# Patient Record
Sex: FEMALE | Race: BLACK OR AFRICAN AMERICAN | NOT HISPANIC OR LATINO | Employment: OTHER | RURAL
[De-identification: names, ages, dates, MRNs, and addresses within clinical notes are randomized per-mention and may not be internally consistent; named-entity substitution may affect disease eponyms.]

---

## 2017-06-27 ENCOUNTER — OFFICE VISIT (OUTPATIENT)
Dept: FAMILY MEDICINE CLINIC | Facility: CLINIC | Age: 57
End: 2017-06-27

## 2017-06-27 VITALS
SYSTOLIC BLOOD PRESSURE: 116 MMHG | TEMPERATURE: 97.7 F | RESPIRATION RATE: 16 BRPM | HEIGHT: 62 IN | DIASTOLIC BLOOD PRESSURE: 80 MMHG | WEIGHT: 150 LBS | BODY MASS INDEX: 27.6 KG/M2 | OXYGEN SATURATION: 98 % | HEART RATE: 83 BPM

## 2017-06-27 DIAGNOSIS — I10 ESSENTIAL HYPERTENSION: Primary | ICD-10-CM

## 2017-06-27 DIAGNOSIS — Z11.59 NEED FOR HEPATITIS C SCREENING TEST: ICD-10-CM

## 2017-06-27 DIAGNOSIS — K59.00 CONSTIPATION, UNSPECIFIED CONSTIPATION TYPE: ICD-10-CM

## 2017-06-27 DIAGNOSIS — Z12.31 ENCOUNTER FOR SCREENING MAMMOGRAM FOR MALIGNANT NEOPLASM OF BREAST: ICD-10-CM

## 2017-06-27 LAB
ALBUMIN SERPL-MCNC: 4.4 G/DL (ref 3.4–4.8)
ALBUMIN/GLOB SERPL: 1.4 G/DL (ref 1.1–1.8)
ALP SERPL-CCNC: 88 U/L (ref 38–126)
ALT SERPL W P-5'-P-CCNC: 26 U/L (ref 9–52)
ANION GAP SERPL CALCULATED.3IONS-SCNC: 11 MMOL/L (ref 5–15)
ARTICHOKE IGE QN: 143 MG/DL (ref 1–129)
AST SERPL-CCNC: 15 U/L (ref 14–36)
BILIRUB SERPL-MCNC: 0.3 MG/DL (ref 0.2–1.3)
BUN BLD-MCNC: 10 MG/DL (ref 7–21)
BUN/CREAT SERPL: 12 (ref 7–25)
CALCIUM SPEC-SCNC: 9.9 MG/DL (ref 8.4–10.2)
CHLORIDE SERPL-SCNC: 102 MMOL/L (ref 95–110)
CHOLEST SERPL-MCNC: 264 MG/DL (ref 0–199)
CO2 SERPL-SCNC: 27 MMOL/L (ref 22–31)
CREAT BLD-MCNC: 0.83 MG/DL (ref 0.5–1)
GFR SERPL CREATININE-BSD FRML MDRD: 85 ML/MIN/1.73 (ref 51–120)
GLOBULIN UR ELPH-MCNC: 3.2 GM/DL (ref 2.3–3.5)
GLUCOSE BLD-MCNC: 91 MG/DL (ref 60–100)
HDLC SERPL-MCNC: 100 MG/DL (ref 60–200)
LDLC/HDLC SERPL: 1.54 {RATIO} (ref 0–3.22)
POTASSIUM BLD-SCNC: 4.3 MMOL/L (ref 3.5–5.1)
PROT SERPL-MCNC: 7.6 G/DL (ref 6.3–8.6)
SODIUM BLD-SCNC: 140 MMOL/L (ref 137–145)
TRIGL SERPL-MCNC: 51 MG/DL (ref 20–199)

## 2017-06-27 PROCEDURE — 99214 OFFICE O/P EST MOD 30 MIN: CPT | Performed by: NURSE PRACTITIONER

## 2017-06-27 PROCEDURE — 80061 LIPID PANEL: CPT | Performed by: NURSE PRACTITIONER

## 2017-06-27 PROCEDURE — 80053 COMPREHEN METABOLIC PANEL: CPT | Performed by: NURSE PRACTITIONER

## 2017-06-27 PROCEDURE — 86803 HEPATITIS C AB TEST: CPT | Performed by: NURSE PRACTITIONER

## 2017-06-27 RX ORDER — POLYETHYLENE GLYCOL 3350 17 G/17G
17 POWDER, FOR SOLUTION ORAL DAILY
Qty: 578 G | Refills: 3 | Status: SHIPPED | OUTPATIENT
Start: 2017-06-27 | End: 2021-07-08

## 2017-06-27 RX ORDER — POLYETHYLENE GLYCOL 3350 17 G/17G
17 POWDER, FOR SOLUTION ORAL DAILY
COMMUNITY
End: 2017-06-27 | Stop reason: CLARIF

## 2017-06-27 NOTE — PROGRESS NOTES
Subjective   Maggie Mathews is a 57 y.o. female.     HPI Comments: Here today to establish.  Has a hx of htn and a psychiatric hx.  She currently sees mental health who prescribe some of her meds.  She also take metroprolol for her b/p and has been out of it.  She is in need of a pap and a mammogram.  She denies any significant family history.    She also says she has had some constipation and was placed on miralax by the [facility] care staff and she will need a refill.    Hypertension   This is a chronic problem. The current episode started more than 1 year ago. The problem is uncontrolled. Pertinent negatives include no anxiety, blurred vision, chest pain, headaches, malaise/fatigue, neck pain, orthopnea, palpitations, peripheral edema, PND, shortness of breath or sweats. There are no associated agents to hypertension. Risk factors for coronary artery disease include post-menopausal state. Past treatments include beta blockers. The current treatment provides significant improvement. Compliance problems include psychosocial issues.  There is no history of angina, kidney disease, CAD/MI, CVA, heart failure, left ventricular hypertrophy, PVD or retinopathy.        The following portions of the patient's history were reviewed and updated as appropriate: allergies, current medications, past family history, past medical history, past social history, past surgical history and problem list.    Review of Systems   Constitutional: Negative.  Negative for malaise/fatigue.   HENT: Negative.    Eyes: Negative for blurred vision.   Respiratory: Negative.  Negative for shortness of breath.    Cardiovascular: Negative.  Negative for chest pain, palpitations, orthopnea and PND.   Musculoskeletal: Negative.  Negative for neck pain.   Skin: Negative.    Neurological: Negative.  Negative for headaches.   Psychiatric/Behavioral: Negative.        Objective   Physical Exam   Constitutional: She is oriented to person, place, and  time. She appears well-developed and well-nourished. No distress.   HENT:   Head: Normocephalic.   Eyes: Pupils are equal, round, and reactive to light.   Neck: Normal range of motion. Neck supple. No thyromegaly present.   Cardiovascular: Normal rate, regular rhythm and normal heart sounds.  Exam reveals no friction rub.    No murmur heard.  Pulmonary/Chest: Effort normal and breath sounds normal. No respiratory distress. She has no wheezes. She has no rales.   Abdominal: Soft.   Musculoskeletal: Normal range of motion.   Neurological: She is alert and oriented to person, place, and time.   Skin: Skin is warm and dry.   Psychiatric: She has a normal mood and affect. Thought content normal.   Nursing note and vitals reviewed.      Assessment/Plan   Maggie was seen today for hypertension.    Diagnoses and all orders for this visit:    Essential hypertension  -     metoprolol tartrate (LOPRESSOR) 25 MG tablet; Take 1 tablet by mouth 2 (Two) Times a Day.  -     Comprehensive metabolic panel  -     Lipid panel    Constipation, unspecified constipation type  -     polyethylene glycol (MIRALAX) powder; Take 17 g by mouth Daily. X 2 weeks, then 2 or 3 days a week prn for constipation.    Need for hepatitis C screening test  -     Hepatitis C antibody    Encounter for screening mammogram for malignant neoplasm of breast   -     Mammo Screening Bilateral With CAD; Future

## 2017-06-28 ENCOUNTER — TELEPHONE (OUTPATIENT)
Dept: FAMILY MEDICINE CLINIC | Facility: CLINIC | Age: 57
End: 2017-06-28

## 2017-06-28 LAB — HCV AB SER DONR QL: NEGATIVE

## 2017-06-28 NOTE — TELEPHONE ENCOUNTER
Mammogram appt Scheduled at [facility] on 08/11/2017 at 4 pm .     left message to contact office and letter sent out with appt date time and location to address on file,

## 2017-06-30 ENCOUNTER — TELEPHONE (OUTPATIENT)
Dept: FAMILY MEDICINE CLINIC | Facility: CLINIC | Age: 57
End: 2017-06-30

## 2017-06-30 RX ORDER — PRAVASTATIN SODIUM 20 MG
20 TABLET ORAL DAILY
Qty: 30 TABLET | Refills: 3 | Status: SHIPPED | OUTPATIENT
Start: 2017-06-30 | End: 2017-10-27 | Stop reason: SDUPTHER

## 2017-06-30 NOTE — TELEPHONE ENCOUNTER
----- Message from MARLY Davison sent at 6/30/2017  7:58 AM CDT -----  Her chol is pretty high.  We needs to start her on pravastatin 20mg a day and genesis in 3 months.

## 2017-09-18 ENCOUNTER — PROCEDURE VISIT (OUTPATIENT)
Dept: FAMILY MEDICINE CLINIC | Facility: CLINIC | Age: 57
End: 2017-09-18

## 2017-09-18 VITALS
DIASTOLIC BLOOD PRESSURE: 68 MMHG | OXYGEN SATURATION: 98 % | WEIGHT: 142 LBS | BODY MASS INDEX: 26.13 KG/M2 | TEMPERATURE: 98.5 F | HEIGHT: 62 IN | HEART RATE: 68 BPM | RESPIRATION RATE: 16 BRPM | SYSTOLIC BLOOD PRESSURE: 110 MMHG

## 2017-09-18 DIAGNOSIS — Z12.4 SCREENING FOR CERVICAL CANCER: Primary | ICD-10-CM

## 2017-09-18 PROCEDURE — 88142 CYTOPATH C/V THIN LAYER: CPT | Performed by: NURSE PRACTITIONER

## 2017-09-18 PROCEDURE — G0101 CA SCREEN;PELVIC/BREAST EXAM: HCPCS | Performed by: NURSE PRACTITIONER

## 2017-09-18 RX ORDER — RISPERIDONE 1 MG/1
1 TABLET ORAL DAILY
COMMUNITY
Start: 2017-09-11 | End: 2022-06-12

## 2017-09-18 NOTE — PROGRESS NOTES
"Subjective   History of Present Illness    Maggie Mathews is a 57 y.o. female who presents for annual exam.    Obstetric History:    1, para 1, full term 1  Menstrual History:     No LMP recorded. thinks periods stopped about age 48.       Sexual History:   not sexually active      Current contraception: tubal ligation  History of abnormal Pap smear: no  ANAY exposure in utero: no  Received Gardasil immunization: no  Perform regular self breast exam: yes - does monthly  Family history of uterine or ovarian cancer: no  Family History of cervical cancer: no  Family History of colon cancer/colon polyps: yes - thinks sister may have had colon cancer.  Regular self breast exam: yes  History of abnormal mammogram: no  Family history of breast cancer: no  History of abnormal lipids: no    The following portions of the patient's history were reviewed and updated as appropriate: allergies, current medications, past family history, past medical history, past social history, past surgical history and problem list.    Review of Systems   Constitutional: Negative.    HENT: Negative.    Respiratory: Negative.    Cardiovascular: Negative.    Gastrointestinal: Negative.    Musculoskeletal: Negative.    Skin: Negative.    Neurological: Negative.    Psychiatric/Behavioral: Negative.        Pertinent items are noted in HPI.     Objective   Physical Exam   Constitutional: She appears well-developed and well-nourished. No distress.   Genitourinary: Rectal exam shows guaiac negative stool. No vaginal discharge found.   Nursing note and vitals reviewed.      /68 (BP Location: Right arm, Patient Position: Sitting, Cuff Size: Adult)  Pulse 68  Temp 98.5 °F (36.9 °C) (Tympanic)   Resp 16  Ht 62\" (157.5 cm)  Wt 142 lb (64.4 kg)  SpO2 98%  BMI 25.97 kg/m2    General:   alert, appears stated age and cooperative   Heart: regular rate and rhythm, S1, S2 normal, no murmur, click, rub or gallop   Lungs: clear to auscultation " bilaterally   Abdomen: soft, non-tender, without masses or organomegaly   Breast: inspection negative, no nipple discharge or bleeding, no masses or nodularity palpable   Vulva: normal   Vagina: normal mucosa   Cervix: no lesions   Uterus: normal size   Adnexa: normal adnexa     Assessment/Plan   There are no diagnoses linked to this encounter.    Await pap smear results.

## 2017-09-25 LAB
LAB AP CASE REPORT: NORMAL
LAB AP GYN ADDITIONAL INFORMATION: NORMAL
LAB AP GYN OTHER FINDINGS: NORMAL
Lab: NORMAL
PATH INTERP SPEC-IMP: NORMAL
STAT OF ADQ CVX/VAG CYTO-IMP: NORMAL

## 2017-10-27 DIAGNOSIS — I10 ESSENTIAL HYPERTENSION: ICD-10-CM

## 2017-10-27 RX ORDER — PRAVASTATIN SODIUM 20 MG
20 TABLET ORAL DAILY
Qty: 30 TABLET | Refills: 3 | Status: SHIPPED | OUTPATIENT
Start: 2017-10-27 | End: 2018-02-26 | Stop reason: SDUPTHER

## 2018-02-26 DIAGNOSIS — I10 ESSENTIAL HYPERTENSION: ICD-10-CM

## 2018-02-26 RX ORDER — PRAVASTATIN SODIUM 20 MG
20 TABLET ORAL DAILY
Qty: 30 TABLET | Refills: 3 | Status: SHIPPED | OUTPATIENT
Start: 2018-02-26 | End: 2018-05-11 | Stop reason: SDUPTHER

## 2018-03-08 ENCOUNTER — OFFICE VISIT (OUTPATIENT)
Dept: FAMILY MEDICINE CLINIC | Facility: CLINIC | Age: 58
End: 2018-03-08

## 2018-03-08 VITALS
HEART RATE: 70 BPM | OXYGEN SATURATION: 98 % | WEIGHT: 153 LBS | TEMPERATURE: 98.1 F | HEIGHT: 62 IN | RESPIRATION RATE: 20 BRPM | BODY MASS INDEX: 28.16 KG/M2 | DIASTOLIC BLOOD PRESSURE: 58 MMHG | SYSTOLIC BLOOD PRESSURE: 90 MMHG

## 2018-03-08 DIAGNOSIS — J06.9 UPPER RESPIRATORY TRACT INFECTION, UNSPECIFIED TYPE: ICD-10-CM

## 2018-03-08 DIAGNOSIS — R05.9 COUGH: Primary | ICD-10-CM

## 2018-03-08 PROCEDURE — 99213 OFFICE O/P EST LOW 20 MIN: CPT | Performed by: NURSE PRACTITIONER

## 2018-03-08 RX ORDER — DEXTROMETHORPHAN HYDROBROMIDE AND PROMETHAZINE HYDROCHLORIDE 15; 6.25 MG/5ML; MG/5ML
5 SYRUP ORAL 4 TIMES DAILY PRN
Qty: 118 ML | Refills: 0 | Status: SHIPPED | OUTPATIENT
Start: 2018-03-08 | End: 2019-07-08

## 2018-03-08 RX ORDER — LORATADINE 10 MG/1
10 TABLET ORAL DAILY
Qty: 20 TABLET | Refills: 1 | Status: SHIPPED | OUTPATIENT
Start: 2018-03-08 | End: 2021-07-08

## 2018-03-08 NOTE — PROGRESS NOTES
Subjective   Maggie Mathews is a 57 y.o. female.     HPI Comments: Uri sx since last Saturday night, march 3, 2018.  She had sore throat and slight fever.  Took hamlet seltzer + for sx.    Cough   This is a new problem. The current episode started in the past 7 days. The problem has been gradually improving. The problem occurs every few hours. The cough is non-productive. Associated symptoms include a fever (low grade), nasal congestion, rhinorrhea and a sore throat. Pertinent negatives include no chest pain, chills, ear congestion, ear pain, headaches, heartburn, hemoptysis, myalgias, postnasal drip, rash, shortness of breath, sweats, weight loss or wheezing. Nothing aggravates the symptoms. Risk factors for lung disease include animal exposure. Treatments tried: hamlet seltzer + The treatment provided moderate relief. There is no history of asthma, bronchiectasis, bronchitis, COPD, emphysema, environmental allergies or pneumonia.   Sore Throat    This is a new problem. The current episode started in the past 7 days. The problem has been gradually improving. Neither side of throat is experiencing more pain than the other. Maximum temperature: reports low grade. The pain is at a severity of 0/10. The patient is experiencing no pain. Associated symptoms include congestion and coughing. Pertinent negatives include no abdominal pain, diarrhea, drooling, ear discharge, ear pain, headaches, hoarse voice, plugged ear sensation, neck pain, shortness of breath, stridor, swollen glands, trouble swallowing or vomiting. She has had no exposure to strep or mono. Treatments tried: hamlet seltzer + The treatment provided moderate relief.        The following portions of the patient's history were reviewed and updated as appropriate: allergies, current medications, past family history, past medical history, past social history, past surgical history and problem list.    Review of Systems   Constitutional: Positive for fever (low  grade). Negative for chills and weight loss.   HENT: Positive for congestion, rhinorrhea and sore throat. Negative for drooling, ear discharge, ear pain, hoarse voice, postnasal drip and trouble swallowing.    Respiratory: Positive for cough. Negative for hemoptysis, shortness of breath, wheezing and stridor.    Cardiovascular: Negative.  Negative for chest pain.   Gastrointestinal: Negative.  Negative for abdominal pain, diarrhea, heartburn and vomiting.   Musculoskeletal: Negative.  Negative for myalgias and neck pain.   Skin: Negative.  Negative for rash.   Allergic/Immunologic: Negative for environmental allergies.   Neurological: Negative.  Negative for headaches.   Psychiatric/Behavioral: Negative.        Objective   Physical Exam   Constitutional: She is oriented to person, place, and time. She appears well-developed and well-nourished. No distress.   HENT:   Head: Normocephalic.   Right Ear: Hearing, tympanic membrane, external ear and ear canal normal. Tympanic membrane is not injected.   Left Ear: Hearing, tympanic membrane, external ear and ear canal normal. Tympanic membrane is not injected.   Nose: Rhinorrhea present. Right sinus exhibits no maxillary sinus tenderness and no frontal sinus tenderness. Left sinus exhibits no maxillary sinus tenderness and no frontal sinus tenderness.   Mouth/Throat: Oropharynx is clear and moist. No oropharyngeal exudate.   Eyes: Pupils are equal, round, and reactive to light.   Neck: Normal range of motion. Neck supple. No thyromegaly present.   Cardiovascular: Normal rate, regular rhythm and normal heart sounds.  Exam reveals no friction rub.    No murmur heard.  Pulmonary/Chest: Effort normal and breath sounds normal. No respiratory distress. She has no wheezes. She has no rales.   Abdominal: Soft.   Musculoskeletal: Normal range of motion.   Neurological: She is alert and oriented to person, place, and time.   Skin: Skin is warm and dry.   Psychiatric: She has a  normal mood and affect. Thought content normal.   Nursing note and vitals reviewed.      Assessment/Plan   Maggie was seen today for cough, sore throat and nasal congestion.    Diagnoses and all orders for this visit:    Cough  -     promethazine-dextromethorphan (PROMETHAZINE-DM) 6.25-15 MG/5ML syrup; Take 5 mL by mouth 4 (Four) Times a Day As Needed for Cough.    Upper respiratory tract infection, unspecified type  -     loratadine (CLARITIN) 10 MG tablet; Take 1 tablet by mouth Daily.

## 2018-05-11 DIAGNOSIS — J06.9 UPPER RESPIRATORY TRACT INFECTION, UNSPECIFIED TYPE: ICD-10-CM

## 2018-05-11 DIAGNOSIS — I10 ESSENTIAL HYPERTENSION: ICD-10-CM

## 2018-05-11 RX ORDER — PRAVASTATIN SODIUM 20 MG
20 TABLET ORAL DAILY
Qty: 30 TABLET | Refills: 3 | Status: SHIPPED | OUTPATIENT
Start: 2018-05-11 | End: 2018-10-18 | Stop reason: SDUPTHER

## 2018-08-06 DIAGNOSIS — Z12.39 SCREENING FOR BREAST CANCER: Primary | ICD-10-CM

## 2018-08-08 ENCOUNTER — TELEPHONE (OUTPATIENT)
Dept: FAMILY MEDICINE CLINIC | Facility: CLINIC | Age: 58
End: 2018-08-08

## 2018-10-08 DIAGNOSIS — Z12.39 SCREENING FOR BREAST CANCER: ICD-10-CM

## 2018-10-18 ENCOUNTER — OFFICE VISIT (OUTPATIENT)
Dept: FAMILY MEDICINE CLINIC | Facility: CLINIC | Age: 58
End: 2018-10-18

## 2018-10-18 ENCOUNTER — APPOINTMENT (OUTPATIENT)
Dept: LAB | Facility: HOSPITAL | Age: 58
End: 2018-10-18

## 2018-10-18 VITALS
RESPIRATION RATE: 18 BRPM | HEART RATE: 67 BPM | WEIGHT: 158 LBS | DIASTOLIC BLOOD PRESSURE: 55 MMHG | SYSTOLIC BLOOD PRESSURE: 97 MMHG | OXYGEN SATURATION: 98 % | TEMPERATURE: 98.2 F | HEIGHT: 62 IN | BODY MASS INDEX: 29.08 KG/M2

## 2018-10-18 DIAGNOSIS — Z23 IMMUNIZATION DUE: ICD-10-CM

## 2018-10-18 DIAGNOSIS — I10 ESSENTIAL HYPERTENSION: Primary | ICD-10-CM

## 2018-10-18 DIAGNOSIS — E78.5 HYPERLIPIDEMIA, UNSPECIFIED HYPERLIPIDEMIA TYPE: ICD-10-CM

## 2018-10-18 PROCEDURE — 80053 COMPREHEN METABOLIC PANEL: CPT | Performed by: NURSE PRACTITIONER

## 2018-10-18 PROCEDURE — 90674 CCIIV4 VAC NO PRSV 0.5 ML IM: CPT | Performed by: NURSE PRACTITIONER

## 2018-10-18 PROCEDURE — 80061 LIPID PANEL: CPT | Performed by: NURSE PRACTITIONER

## 2018-10-18 PROCEDURE — G0008 ADMIN INFLUENZA VIRUS VAC: HCPCS | Performed by: NURSE PRACTITIONER

## 2018-10-18 PROCEDURE — 99214 OFFICE O/P EST MOD 30 MIN: CPT | Performed by: NURSE PRACTITIONER

## 2018-10-18 RX ORDER — PRAVASTATIN SODIUM 20 MG
20 TABLET ORAL DAILY
Qty: 30 TABLET | Refills: 3 | Status: SHIPPED | OUTPATIENT
Start: 2018-10-18 | End: 2019-02-18 | Stop reason: SDUPTHER

## 2018-10-18 NOTE — PROGRESS NOTES
Subjective   Maggie Mathews is a 58 y.o. female.     Here today needing refills on her meds.  She has a hx of htn and hyperlip.  She is well controlled.  Her depression med is prescribed and monitored elsewhere.      Hypertension   This is a chronic problem. The current episode started more than 1 year ago. The problem is controlled. Pertinent negatives include no anxiety, blurred vision, chest pain, headaches, malaise/fatigue, neck pain, orthopnea, peripheral edema, PND, shortness of breath or sweats. There are no associated agents to hypertension. Risk factors for coronary artery disease include dyslipidemia, post-menopausal state and sedentary lifestyle. Past treatments include beta blockers. Current antihypertension treatment includes beta blockers. The current treatment provides significant improvement. There are no compliance problems.  There is no history of angina, kidney disease, CAD/MI, CVA, heart failure, PVD or retinopathy. There is no history of chronic renal disease.   Hyperlipidemia   This is a chronic problem. The current episode started more than 1 year ago. Recent lipid tests were reviewed and are normal. She has no history of chronic renal disease, diabetes, hypothyroidism, obesity or nephrotic syndrome. Factors aggravating her hyperlipidemia include beta blockers. Pertinent negatives include no chest pain, focal sensory loss, focal weakness, leg pain, myalgias or shortness of breath. Current antihyperlipidemic treatment includes statins. The current treatment provides significant improvement of lipids. There are no compliance problems.  Risk factors for coronary artery disease include dyslipidemia, hypertension and post-menopausal.        The following portions of the patient's history were reviewed and updated as appropriate: allergies, current medications, past family history, past medical history, past social history, past surgical history and problem list.    Review of Systems    Constitutional: Negative.  Negative for malaise/fatigue.   HENT: Negative.    Eyes: Negative for blurred vision.   Respiratory: Negative.  Negative for shortness of breath.    Cardiovascular: Negative.  Negative for chest pain, orthopnea and PND.   Musculoskeletal: Negative.  Negative for myalgias and neck pain.   Skin: Negative.    Neurological: Negative.  Negative for focal weakness.   Psychiatric/Behavioral: Negative.        Objective   Physical Exam   Constitutional: She is oriented to person, place, and time. She appears well-developed and well-nourished. No distress.   HENT:   Head: Normocephalic.   Eyes: Pupils are equal, round, and reactive to light.   Neck: Normal range of motion. Neck supple. No thyromegaly present.   Cardiovascular: Normal rate, regular rhythm and normal heart sounds.  Exam reveals no friction rub.    No murmur heard.  Pulmonary/Chest: Effort normal and breath sounds normal. No respiratory distress. She has no wheezes. She has no rales.   Abdominal: Soft. Bowel sounds are normal. She exhibits no distension.   Musculoskeletal: Normal range of motion.   Neurological: She is alert and oriented to person, place, and time.   Skin: Skin is warm and dry.   Psychiatric: She has a normal mood and affect. Thought content normal.   Nursing note and vitals reviewed.        Assessment/Plan   Maggie was seen today for hypertension, depression and hyperlipidemia.    Diagnoses and all orders for this visit:    Essential hypertension  -     metoprolol tartrate (LOPRESSOR) 25 MG tablet; Take 1 tablet by mouth 2 (Two) Times a Day.  -     Comprehensive metabolic panel    Hyperlipidemia, unspecified hyperlipidemia type  -     pravastatin (PRAVACHOL) 20 MG tablet; Take 1 tablet by mouth Daily.  -     Lipid panel    Immunization due  -     Flucelvax Quad=>4Years ([id number])

## 2018-10-19 LAB
ALBUMIN SERPL-MCNC: 4.1 G/DL (ref 3.4–4.8)
ALBUMIN/GLOB SERPL: 1.3 G/DL (ref 1.1–1.8)
ALP SERPL-CCNC: 88 U/L (ref 38–126)
ALT SERPL W P-5'-P-CCNC: 24 U/L (ref 9–52)
ANION GAP SERPL CALCULATED.3IONS-SCNC: 10 MMOL/L (ref 5–15)
ARTICHOKE IGE QN: 104 MG/DL (ref 1–129)
AST SERPL-CCNC: 19 U/L (ref 14–36)
BILIRUB SERPL-MCNC: 0.3 MG/DL (ref 0.2–1.3)
BUN BLD-MCNC: 10 MG/DL (ref 7–21)
BUN/CREAT SERPL: 12 (ref 7–25)
CALCIUM SPEC-SCNC: 9.4 MG/DL (ref 8.4–10.2)
CHLORIDE SERPL-SCNC: 100 MMOL/L (ref 95–110)
CHOLEST SERPL-MCNC: 203 MG/DL (ref 0–199)
CO2 SERPL-SCNC: 28 MMOL/L (ref 22–31)
CREAT BLD-MCNC: 0.83 MG/DL (ref 0.5–1)
GFR SERPL CREATININE-BSD FRML MDRD: 85 ML/MIN/1.73 (ref 45–104)
GLOBULIN UR ELPH-MCNC: 3.1 GM/DL (ref 2.3–3.5)
GLUCOSE BLD-MCNC: 67 MG/DL (ref 60–100)
HDLC SERPL-MCNC: 81 MG/DL (ref 60–200)
LDLC/HDLC SERPL: 1.28 {RATIO} (ref 0–3.22)
POTASSIUM BLD-SCNC: 4.2 MMOL/L (ref 3.5–5.1)
PROT SERPL-MCNC: 7.2 G/DL (ref 6.3–8.6)
SODIUM BLD-SCNC: 138 MMOL/L (ref 137–145)
TRIGL SERPL-MCNC: 90 MG/DL (ref 20–199)

## 2019-02-18 DIAGNOSIS — E78.5 HYPERLIPIDEMIA, UNSPECIFIED HYPERLIPIDEMIA TYPE: ICD-10-CM

## 2019-02-18 DIAGNOSIS — I10 ESSENTIAL HYPERTENSION: ICD-10-CM

## 2019-02-18 RX ORDER — PRAVASTATIN SODIUM 20 MG
20 TABLET ORAL DAILY
Qty: 30 TABLET | Refills: 3 | Status: SHIPPED | OUTPATIENT
Start: 2019-02-18 | End: 2019-06-20 | Stop reason: SDUPTHER

## 2019-06-19 ENCOUNTER — TELEPHONE (OUTPATIENT)
Dept: FAMILY MEDICINE CLINIC | Facility: CLINIC | Age: 59
End: 2019-06-19

## 2019-06-19 DIAGNOSIS — I10 ESSENTIAL HYPERTENSION: ICD-10-CM

## 2019-06-19 DIAGNOSIS — E78.5 HYPERLIPIDEMIA, UNSPECIFIED HYPERLIPIDEMIA TYPE: ICD-10-CM

## 2019-06-19 NOTE — TELEPHONE ENCOUNTER
Patient needs refill on Pravastatin and Metoprol pharmacy is [facility]

## 2019-06-20 RX ORDER — PRAVASTATIN SODIUM 20 MG
20 TABLET ORAL DAILY
Qty: 30 TABLET | Refills: 3 | Status: SHIPPED | OUTPATIENT
Start: 2019-06-20 | End: 2019-09-27 | Stop reason: SDUPTHER

## 2019-07-02 ENCOUNTER — TELEPHONE (OUTPATIENT)
Dept: FAMILY MEDICINE CLINIC | Facility: CLINIC | Age: 59
End: 2019-07-02

## 2019-07-02 ENCOUNTER — APPOINTMENT (OUTPATIENT)
Dept: LAB | Facility: HOSPITAL | Age: 59
End: 2019-07-02

## 2019-07-02 ENCOUNTER — PROCEDURE VISIT (OUTPATIENT)
Dept: FAMILY MEDICINE CLINIC | Facility: CLINIC | Age: 59
End: 2019-07-02

## 2019-07-02 VITALS
TEMPERATURE: 97.8 F | WEIGHT: 160 LBS | HEART RATE: 59 BPM | BODY MASS INDEX: 29.44 KG/M2 | HEIGHT: 62 IN | DIASTOLIC BLOOD PRESSURE: 50 MMHG | RESPIRATION RATE: 18 BRPM | OXYGEN SATURATION: 98 % | SYSTOLIC BLOOD PRESSURE: 102 MMHG

## 2019-07-02 DIAGNOSIS — I10 ESSENTIAL HYPERTENSION: ICD-10-CM

## 2019-07-02 DIAGNOSIS — E78.5 HYPERLIPIDEMIA, UNSPECIFIED HYPERLIPIDEMIA TYPE: ICD-10-CM

## 2019-07-02 DIAGNOSIS — Z12.4 SCREENING FOR CERVICAL CANCER: Primary | ICD-10-CM

## 2019-07-02 DIAGNOSIS — M25.511 ACUTE PAIN OF RIGHT SHOULDER: ICD-10-CM

## 2019-07-02 LAB
ALBUMIN SERPL-MCNC: 4.4 G/DL (ref 3.5–5.2)
ALBUMIN/GLOB SERPL: 1.4 G/DL
ALP SERPL-CCNC: 88 U/L (ref 39–117)
ALT SERPL W P-5'-P-CCNC: 8 U/L (ref 1–33)
ANION GAP SERPL CALCULATED.3IONS-SCNC: 11.5 MMOL/L (ref 5–15)
AST SERPL-CCNC: 10 U/L (ref 1–32)
BILIRUB SERPL-MCNC: 0.3 MG/DL (ref 0.2–1.2)
BUN BLD-MCNC: 9 MG/DL (ref 8–23)
BUN/CREAT SERPL: 10.1 (ref 7–25)
CALCIUM SPEC-SCNC: 10.1 MG/DL (ref 8.6–10.5)
CHLORIDE SERPL-SCNC: 103 MMOL/L (ref 98–107)
CHOLEST SERPL-MCNC: 209 MG/DL (ref 0–200)
CO2 SERPL-SCNC: 26.5 MMOL/L (ref 22–29)
CREAT BLD-MCNC: 0.89 MG/DL (ref 0.57–1)
GFR SERPL CREATININE-BSD FRML MDRD: 78 ML/MIN/1.73
GLOBULIN UR ELPH-MCNC: 3.1 GM/DL
GLUCOSE BLD-MCNC: 91 MG/DL (ref 65–99)
HDLC SERPL-MCNC: 87 MG/DL (ref 40–60)
LDLC SERPL CALC-MCNC: 107 MG/DL (ref 0–100)
LDLC/HDLC SERPL: 1.23 {RATIO}
POTASSIUM BLD-SCNC: 4.1 MMOL/L (ref 3.5–5.2)
PROT SERPL-MCNC: 7.5 G/DL (ref 6–8.5)
SODIUM BLD-SCNC: 141 MMOL/L (ref 136–145)
TRIGL SERPL-MCNC: 73 MG/DL (ref 0–150)
VLDLC SERPL-MCNC: 14.6 MG/DL (ref 5–40)

## 2019-07-02 PROCEDURE — G0101 CA SCREEN;PELVIC/BREAST EXAM: HCPCS | Performed by: NURSE PRACTITIONER

## 2019-07-02 PROCEDURE — G0123 SCREEN CERV/VAG THIN LAYER: HCPCS | Performed by: NURSE PRACTITIONER

## 2019-07-02 PROCEDURE — 80061 LIPID PANEL: CPT | Performed by: NURSE PRACTITIONER

## 2019-07-02 PROCEDURE — 80053 COMPREHEN METABOLIC PANEL: CPT | Performed by: NURSE PRACTITIONER

## 2019-07-02 PROCEDURE — 99214 OFFICE O/P EST MOD 30 MIN: CPT | Performed by: NURSE PRACTITIONER

## 2019-07-02 RX ORDER — DICLOFENAC SODIUM 75 MG/1
75 TABLET, DELAYED RELEASE ORAL 2 TIMES DAILY
Qty: 60 TABLET | Refills: 3 | Status: SHIPPED | OUTPATIENT
Start: 2019-07-02 | End: 2021-07-08

## 2019-07-02 NOTE — TELEPHONE ENCOUNTER
Dennis from [facility] pharmacy called for clarification on diclofenac 75mg, shows increased risk of upper GI bleeding with citalopram. Please call pharmacy.

## 2019-07-02 NOTE — PROGRESS NOTES
Subjective   History of Present Illness    Maggie Mathews is a 59 y.o. female who presents for annual exam.  She is also having right shoulder pain for the past 2 months.  No reported injuries.  It seems to be worse at night and she has some difficulty sleeping due to the pain.  Obstetric History:   1, para 1, full term 1   Menstrual History:     No LMP recorded. Patient is postmenopausal.   periods stopped in her 50s.    Sexual History:     Is not sexually active    Current contraception: post menopausal status  History of abnormal Pap smear: no  ANAY exposure in utero: no  Received Gardasil immunization: yes - monthly  Perform regular self breast exam: no  Family history of uterine or ovarian cancer: no  Family History of cervical cancer: no  Family History of colon cancer/colon polyps: yes - sister  Regular self breast exam: yes  History of abnormal mammogram: no  Family history of breast cancer: no  History of abnormal lipids: no    The following portions of the patient's history were reviewed and updated as appropriate: allergies, current medications, past family history, past medical history, past social history, past surgical history and problem list.    Review of Systems   Constitutional: Negative.    HENT: Negative.    Eyes: Negative.    Respiratory: Negative.    Cardiovascular: Negative.    Gastrointestinal: Negative.    Genitourinary: Negative.    Musculoskeletal: Positive for arthralgias (right shoulder).   Skin: Negative.    Neurological: Negative.    Psychiatric/Behavioral: Negative.        Pertinent items are noted in HPI.     Objective   Physical Exam   Constitutional: She appears well-developed and well-nourished. No distress.   Genitourinary: Rectal exam shows guaiac negative stool.   Musculoskeletal:        Right shoulder: She exhibits tenderness and pain. She exhibits normal range of motion.   X ray of right shoulder reviewed and is negative.  She has good rom and is able to extend her  "right arm above her head without difficulty.   Nursing note and vitals reviewed.      /50 (BP Location: Right arm, Patient Position: Sitting, Cuff Size: Adult)   Pulse 59   Temp 97.8 °F (36.6 °C) (Tympanic)   Resp 18   Ht 157.5 cm (62\")   Wt 72.6 kg (160 lb)   SpO2 98%   BMI 29.26 kg/m²     General:   alert, appears stated age and cooperative   Heart: regular rate and rhythm, S1, S2 normal, no murmur, click, rub or gallop   Lungs: clear to auscultation bilaterally   Abdomen: soft, non-tender, without masses or organomegaly   Breast: inspection negative, no nipple discharge or bleeding, no masses or nodularity palpable, mammo was done in April 2019 at [facility] and was reported as normal.   Vulva: normal   Vagina: normal mucosa   Cervix: no lesions   Uterus: normal size   Adnexa: normal adnexa     Assessment/Plan   Maggie was seen today for gynecologic exam.    Diagnoses and all orders for this visit:    Screening for cervical cancer  -     Liquid-based Pap Smear, Screening    Hyperlipidemia, unspecified hyperlipidemia type  -     Lipid Panel    Essential hypertension  -     Comprehensive metabolic panel    Acute pain of right shoulder  -     XR Shoulder 2+ View Right (In Office)  -     diclofenac (VOLTAREN) 75 MG EC tablet; Take 1 tablet by mouth 2 (Two) Times a Day.        Await pap smear results.             "

## 2019-07-05 LAB
GEN CATEG CVX/VAG CYTO-IMP: NORMAL
LAB AP CASE REPORT: NORMAL
LAB AP GYN ADDITIONAL INFORMATION: NORMAL
PATH INTERP SPEC-IMP: NORMAL
STAT OF ADQ CVX/VAG CYTO-IMP: NORMAL

## 2019-07-08 NOTE — PATIENT INSTRUCTIONS
Calorie Counting for Weight Loss  Calories are units of energy. Your body needs a certain amount of calories from food to keep you going throughout the day. When you eat more calories than your body needs, your body stores the extra calories as fat. When you eat fewer calories than your body needs, your body burns fat to get the energy it needs.  Calorie counting means keeping track of how many calories you eat and drink each day. Calorie counting can be helpful if you need to lose weight. If you make sure to eat fewer calories than your body needs, you should lose weight. Ask your health care provider what a healthy weight is for you.  For calorie counting to work, you will need to eat the right number of calories in a day in order to lose a healthy amount of weight per week. A dietitian can help you determine how many calories you need in a day and will give you suggestions on how to reach your calorie goal.  · A healthy amount of weight to lose per week is usually 1-2 lb (0.5-0.9 kg). This usually means that your daily calorie intake should be reduced by 500-750 calories.  · Eating 1,200 - 1,500 calories per day can help most women lose weight.  · Eating 1,500 - 1,800 calories per day can help most men lose weight.    What is my plan?  My goal is to have __________ calories per day.  If I have this many calories per day, I should lose around __________ pounds per week.  What do I need to know about calorie counting?  In order to meet your daily calorie goal, you will need to:  · Find out how many calories are in each food you would like to eat. Try to do this before you eat.  · Decide how much of the food you plan to eat.  · Write down what you ate and how many calories it had. Doing this is called keeping a food log.    To successfully lose weight, it is important to balance calorie counting with a healthy lifestyle that includes regular activity. Aim for 150 minutes of moderate exercise (such as walking) or 75  minutes of vigorous exercise (such as running) each week.  Where do I find calorie information?    The number of calories in a food can be found on a Nutrition Facts label. If a food does not have a Nutrition Facts label, try to look up the calories online or ask your dietitian for help.  Remember that calories are listed per serving. If you choose to have more than one serving of a food, you will have to multiply the calories per serving by the amount of servings you plan to eat. For example, the label on a package of bread might say that a serving size is 1 slice and that there are 90 calories in a serving. If you eat 1 slice, you will have eaten 90 calories. If you eat 2 slices, you will have eaten 180 calories.  How do I keep a food log?  Immediately after each meal, record the following information in your food log:  · What you ate. Don't forget to include toppings, sauces, and other extras on the food.  · How much you ate. This can be measured in cups, ounces, or number of items.  · How many calories each food and drink had.  · The total number of calories in the meal.    Keep your food log near you, such as in a small notebook in your pocket, or use a mobile glendy or website. Some programs will calculate calories for you and show you how many calories you have left for the day to meet your goal.  What are some calorie counting tips?  · Use your calories on foods and drinks that will fill you up and not leave you hungry:  ? Some examples of foods that fill you up are nuts and nut butters, vegetables, lean proteins, and high-fiber foods like whole grains. High-fiber foods are foods with more than 5 g fiber per serving.  ? Drinks such as sodas, specialty coffee drinks, alcohol, and juices have a lot of calories, yet do not fill you up.  · Eat nutritious foods and avoid empty calories. Empty calories are calories you get from foods or beverages that do not have many vitamins or protein, such as candy, sweets, and  "soda. It is better to have a nutritious high-calorie food (such as an avocado) than a food with few nutrients (such as a bag of chips).  · Know how many calories are in the foods you eat most often. This will help you calculate calorie counts faster.  · Pay attention to calories in drinks. Low-calorie drinks include water and unsweetened drinks.  · Pay attention to nutrition labels for \"low fat\" or \"fat free\" foods. These foods sometimes have the same amount of calories or more calories than the full fat versions. They also often have added sugar, starch, or salt, to make up for flavor that was removed with the fat.  · Find a way of tracking calories that works for you. Get creative. Try different apps or programs if writing down calories does not work for you.  What are some portion control tips?  · Know how many calories are in a serving. This will help you know how many servings of a certain food you can have.  · Use a measuring cup to measure serving sizes. You could also try weighing out portions on a kitchen scale. With time, you will be able to estimate serving sizes for some foods.  · Take some time to put servings of different foods on your favorite plates, bowls, and cups so you know what a serving looks like.  · Try not to eat straight from a bag or box. Doing this can lead to overeating. Put the amount you would like to eat in a cup or on a plate to make sure you are eating the right portion.  · Use smaller plates, glasses, and bowls to prevent overeating.  · Try not to multitask (for example, watch TV or use your computer) while eating. If it is time to eat, sit down at a table and enjoy your food. This will help you to know when you are full. It will also help you to be aware of what you are eating and how much you are eating.  What are tips for following this plan?  Reading food labels  · Check the calorie count compared to the serving size. The serving size may be smaller than what you are used to " eating.  · Check the source of the calories. Make sure the food you are eating is high in vitamins and protein and low in saturated and trans fats.  Shopping  · Read nutrition labels while you shop. This will help you make healthy decisions before you decide to purchase your food.  · Make a grocery list and stick to it.  Cooking  · Try to cook your favorite foods in a healthier way. For example, try baking instead of frying.  · Use low-fat dairy products.  Meal planning  · Use more fruits and vegetables. Half of your plate should be fruits and vegetables.  · Include lean proteins like poultry and fish.  How do I count calories when eating out?  · Ask for smaller portion sizes.  · Consider sharing an entree and sides instead of getting your own entree.  · If you get your own entree, eat only half. Ask for a box at the beginning of your meal and put the rest of your entree in it so you are not tempted to eat it.  · If calories are listed on the menu, choose the lower calorie options.  · Choose dishes that include vegetables, fruits, whole grains, low-fat dairy products, and lean protein.  · Choose items that are boiled, broiled, grilled, or steamed. Stay away from items that are buttered, battered, fried, or served with cream sauce. Items labeled “crispy” are usually fried, unless stated otherwise.  · Choose water, low-fat milk, unsweetened iced tea, or other drinks without added sugar. If you want an alcoholic beverage, choose a lower calorie option such as a glass of wine or light beer.  · Ask for dressings, sauces, and syrups on the side. These are usually high in calories, so you should limit the amount you eat.  · If you want a salad, choose a garden salad and ask for grilled meats. Avoid extra toppings like romero, cheese, or fried items. Ask for the dressing on the side, or ask for olive oil and vinegar or lemon to use as dressing.  · Estimate how many servings of a food you are given. For example, a serving of  cooked rice is ½ cup or about the size of half a baseball. Knowing serving sizes will help you be aware of how much food you are eating at restaurants. The list below tells you how big or small some common portion sizes are based on everyday objects:  ? 1 oz--4 stacked dice.  ? 3 oz--1 deck of cards.  ? 1 tsp--1 die.  ? 1 Tbsp--½ a ping-pong ball.  ? 2 Tbsp--1 ping-pong ball.  ? ½ cup--½ baseball.  ? 1 cup--1 baseball.  Summary  · Calorie counting means keeping track of how many calories you eat and drink each day. If you eat fewer calories than your body needs, you should lose weight.  · A healthy amount of weight to lose per week is usually 1-2 lb (0.5-0.9 kg). This usually means reducing your daily calorie intake by 500-750 calories.  · The number of calories in a food can be found on a Nutrition Facts label. If a food does not have a Nutrition Facts label, try to look up the calories online or ask your dietitian for help.  · Use your calories on foods and drinks that will fill you up, and not on foods and drinks that will leave you hungry.  · Use smaller plates, glasses, and bowls to prevent overeating.  This information is not intended to replace advice given to you by your health care provider. Make sure you discuss any questions you have with your health care provider.  Document Released: 12/18/2006 Document Revised: 11/17/2017 Document Reviewed: 11/17/2017  [employer] Patient Education © 2019 [employer]      Exercising to Lose Weight  Exercising can help you to lose weight. In order to lose weight through exercise, you need to do vigorous-intensity exercise. You can tell that you are exercising with vigorous intensity if you are breathing very hard and fast and cannot hold a conversation while exercising.  Moderate-intensity exercise helps to maintain your current weight. You can tell that you are exercising at a moderate level if you have a higher heart rate and faster breathing, but you are  still able to hold a conversation.  How often should I exercise?  Choose an activity that you enjoy and set realistic goals. Your health care provider can help you to make an activity plan that works for you. Exercise regularly as directed by your health care provider. This may include:  · Doing resistance training twice each week, such as:  ? Push-ups.  ? Sit-ups.  ? Lifting weights.  ? Using resistance bands.  · Doing a given intensity of exercise for a given amount of time. Choose from these options:  ? 150 minutes of moderate-intensity exercise every week.  ? 75 minutes of vigorous-intensity exercise every week.  ? A mix of moderate-intensity and vigorous-intensity exercise every week.    Children, pregnant women, people who are out of shape, people who are overweight, and older adults may need to consult a health care provider for individual recommendations. If you have any sort of medical condition, be sure to consult your health care provider before starting a new exercise program.  What are some activities that can help me to lose weight?  · Walking at a rate of at least 4.5 miles an hour.  · Jogging or running at a rate of 5 miles per hour.  · Biking at a rate of at least 10 miles per hour.  · Lap swimming.  · Roller-skating or in-line skating.  · Cross-country skiing.  · Vigorous competitive sports, such as football, basketball, and soccer.  · Jumping rope.  · Aerobic dancing.  How can I be more active in my day-to-day activities?  · Use the stairs instead of the elevator.  · Take a walk during your lunch break.  · If you drive, park your car farther away from work or school.  · If you take public transportation, get off one stop early and walk the rest of the way.  · Make all of your phone calls while standing up and walking around.  · Get up, stretch, and walk around every 30 minutes throughout the day.  What guidelines should I follow while exercising?  · Do not exercise so much that you hurt yourself,  feel dizzy, or get very short of breath.  · Consult your health care provider prior to starting a new exercise program.  · Wear comfortable clothes and shoes with good support.  · Drink plenty of water while you exercise to prevent dehydration or heat stroke. Body water is lost during exercise and must be replaced.  · Work out until you breathe faster and your heart beats faster.  This information is not intended to replace advice given to you by your health care provider. Make sure you discuss any questions you have with your health care provider.  Document Released: 01/20/2012 Document Revised: 05/25/2017 Document Reviewed: 05/21/2015  [employer] Patient Education © 2019 [employer]

## 2019-09-27 DIAGNOSIS — E78.5 HYPERLIPIDEMIA, UNSPECIFIED HYPERLIPIDEMIA TYPE: ICD-10-CM

## 2019-09-30 RX ORDER — PRAVASTATIN SODIUM 20 MG
TABLET ORAL
Qty: 30 TABLET | Refills: 3 | Status: SHIPPED | OUTPATIENT
Start: 2019-09-30 | End: 2019-10-18 | Stop reason: SDUPTHER

## 2019-10-18 ENCOUNTER — TELEPHONE (OUTPATIENT)
Dept: FAMILY MEDICINE CLINIC | Facility: CLINIC | Age: 59
End: 2019-10-18

## 2019-10-18 DIAGNOSIS — E78.5 HYPERLIPIDEMIA, UNSPECIFIED HYPERLIPIDEMIA TYPE: ICD-10-CM

## 2019-10-18 DIAGNOSIS — I10 ESSENTIAL HYPERTENSION: ICD-10-CM

## 2019-10-18 RX ORDER — PRAVASTATIN SODIUM 20 MG
20 TABLET ORAL DAILY
Qty: 30 TABLET | Refills: 3 | Status: SHIPPED | OUTPATIENT
Start: 2019-10-18 | End: 2020-01-31

## 2020-01-31 DIAGNOSIS — E78.5 HYPERLIPIDEMIA, UNSPECIFIED HYPERLIPIDEMIA TYPE: ICD-10-CM

## 2020-01-31 RX ORDER — PRAVASTATIN SODIUM 20 MG
TABLET ORAL
Qty: 30 TABLET | Refills: 0 | Status: SHIPPED | OUTPATIENT
Start: 2020-01-31 | End: 2020-02-13 | Stop reason: SDUPTHER

## 2020-02-13 ENCOUNTER — TELEPHONE (OUTPATIENT)
Dept: FAMILY MEDICINE CLINIC | Facility: CLINIC | Age: 60
End: 2020-02-13

## 2020-02-13 DIAGNOSIS — I10 ESSENTIAL HYPERTENSION: ICD-10-CM

## 2020-02-13 DIAGNOSIS — E78.5 HYPERLIPIDEMIA, UNSPECIFIED HYPERLIPIDEMIA TYPE: ICD-10-CM

## 2020-02-13 RX ORDER — PRAVASTATIN SODIUM 20 MG
20 TABLET ORAL DAILY
Qty: 30 TABLET | Refills: 3 | Status: SHIPPED | OUTPATIENT
Start: 2020-02-13 | End: 2020-06-11 | Stop reason: SDUPTHER

## 2020-06-11 DIAGNOSIS — I10 ESSENTIAL HYPERTENSION: ICD-10-CM

## 2020-06-11 DIAGNOSIS — E78.5 HYPERLIPIDEMIA, UNSPECIFIED HYPERLIPIDEMIA TYPE: ICD-10-CM

## 2020-06-11 RX ORDER — PRAVASTATIN SODIUM 20 MG
20 TABLET ORAL DAILY
Qty: 30 TABLET | Refills: 0 | Status: SHIPPED | OUTPATIENT
Start: 2020-06-11 | End: 2020-07-16 | Stop reason: SDUPTHER

## 2020-07-16 DIAGNOSIS — E78.5 HYPERLIPIDEMIA, UNSPECIFIED HYPERLIPIDEMIA TYPE: ICD-10-CM

## 2020-07-16 DIAGNOSIS — I10 ESSENTIAL HYPERTENSION: ICD-10-CM

## 2020-07-16 RX ORDER — PRAVASTATIN SODIUM 20 MG
20 TABLET ORAL DAILY
Qty: 30 TABLET | Refills: 0 | Status: SHIPPED | OUTPATIENT
Start: 2020-07-16 | End: 2020-07-21 | Stop reason: SDUPTHER

## 2020-07-21 ENCOUNTER — LAB (OUTPATIENT)
Dept: LAB | Facility: HOSPITAL | Age: 60
End: 2020-07-21

## 2020-07-21 ENCOUNTER — OFFICE VISIT (OUTPATIENT)
Dept: FAMILY MEDICINE CLINIC | Facility: CLINIC | Age: 60
End: 2020-07-21

## 2020-07-21 VITALS
DIASTOLIC BLOOD PRESSURE: 70 MMHG | TEMPERATURE: 98.3 F | HEART RATE: 86 BPM | OXYGEN SATURATION: 97 % | WEIGHT: 163 LBS | SYSTOLIC BLOOD PRESSURE: 102 MMHG | RESPIRATION RATE: 19 BRPM | HEIGHT: 62 IN | BODY MASS INDEX: 30 KG/M2

## 2020-07-21 DIAGNOSIS — I10 ESSENTIAL HYPERTENSION: Primary | ICD-10-CM

## 2020-07-21 DIAGNOSIS — Z12.31 ENCOUNTER FOR SCREENING MAMMOGRAM FOR MALIGNANT NEOPLASM OF BREAST: ICD-10-CM

## 2020-07-21 DIAGNOSIS — E78.5 HYPERLIPIDEMIA, UNSPECIFIED HYPERLIPIDEMIA TYPE: ICD-10-CM

## 2020-07-21 LAB
ALBUMIN SERPL-MCNC: 4.4 G/DL (ref 3.5–5.2)
ALBUMIN/GLOB SERPL: 1.4 G/DL
ALP SERPL-CCNC: 75 U/L (ref 39–117)
ALT SERPL W P-5'-P-CCNC: 10 U/L (ref 1–33)
ANION GAP SERPL CALCULATED.3IONS-SCNC: 10.1 MMOL/L (ref 5–15)
AST SERPL-CCNC: 15 U/L (ref 1–32)
BILIRUB SERPL-MCNC: 0.3 MG/DL (ref 0–1.2)
BUN SERPL-MCNC: 11 MG/DL (ref 8–23)
BUN/CREAT SERPL: 11.2 (ref 7–25)
CALCIUM SPEC-SCNC: 9.8 MG/DL (ref 8.6–10.5)
CHLORIDE SERPL-SCNC: 104 MMOL/L (ref 98–107)
CHOLEST SERPL-MCNC: 204 MG/DL (ref 0–200)
CO2 SERPL-SCNC: 25.9 MMOL/L (ref 22–29)
CREAT SERPL-MCNC: 0.98 MG/DL (ref 0.57–1)
GFR SERPL CREATININE-BSD FRML MDRD: 70 ML/MIN/1.73
GLOBULIN UR ELPH-MCNC: 3.1 GM/DL
GLUCOSE SERPL-MCNC: 98 MG/DL (ref 65–99)
HDLC SERPL-MCNC: 84 MG/DL (ref 40–60)
LDLC SERPL CALC-MCNC: 108 MG/DL (ref 0–100)
LDLC/HDLC SERPL: 1.29 {RATIO}
POTASSIUM SERPL-SCNC: 3.9 MMOL/L (ref 3.5–5.2)
PROT SERPL-MCNC: 7.5 G/DL (ref 6–8.5)
SODIUM SERPL-SCNC: 140 MMOL/L (ref 136–145)
TRIGL SERPL-MCNC: 59 MG/DL (ref 0–150)
VLDLC SERPL-MCNC: 11.8 MG/DL (ref 5–40)

## 2020-07-21 PROCEDURE — 80061 LIPID PANEL: CPT | Performed by: NURSE PRACTITIONER

## 2020-07-21 PROCEDURE — 80053 COMPREHEN METABOLIC PANEL: CPT | Performed by: NURSE PRACTITIONER

## 2020-07-21 PROCEDURE — 99213 OFFICE O/P EST LOW 20 MIN: CPT | Performed by: NURSE PRACTITIONER

## 2020-07-21 RX ORDER — PRAVASTATIN SODIUM 20 MG
20 TABLET ORAL DAILY
Qty: 30 TABLET | Refills: 5 | Status: SHIPPED | OUTPATIENT
Start: 2020-07-21 | End: 2020-11-10 | Stop reason: SDUPTHER

## 2020-07-28 ENCOUNTER — TELEPHONE (OUTPATIENT)
Dept: FAMILY MEDICINE CLINIC | Facility: CLINIC | Age: 60
End: 2020-07-28

## 2020-08-04 NOTE — PATIENT INSTRUCTIONS
Calorie Counting for Weight Loss  Calories are units of energy. Your body needs a certain amount of calories from food to keep you going throughout the day. When you eat more calories than your body needs, your body stores the extra calories as fat. When you eat fewer calories than your body needs, your body burns fat to get the energy it needs.  Calorie counting means keeping track of how many calories you eat and drink each day. Calorie counting can be helpful if you need to lose weight. If you make sure to eat fewer calories than your body needs, you should lose weight. Ask your health care provider what a healthy weight is for you.  For calorie counting to work, you will need to eat the right number of calories in a day in order to lose a healthy amount of weight per week. A dietitian can help you determine how many calories you need in a day and will give you suggestions on how to reach your calorie goal.  · A healthy amount of weight to lose per week is usually 1-2 lb (0.5-0.9 kg). This usually means that your daily calorie intake should be reduced by 500-750 calories.  · Eating 1,200 - 1,500 calories per day can help most women lose weight.  · Eating 1,500 - 1,800 calories per day can help most men lose weight.  What is my plan?  My goal is to have __________ calories per day.  If I have this many calories per day, I should lose around __________ pounds per week.  What do I need to know about calorie counting?  In order to meet your daily calorie goal, you will need to:  · Find out how many calories are in each food you would like to eat. Try to do this before you eat.  · Decide how much of the food you plan to eat.  · Write down what you ate and how many calories it had. Doing this is called keeping a food log.  To successfully lose weight, it is important to balance calorie counting with a healthy lifestyle that includes regular activity. Aim for 150 minutes of moderate exercise (such as walking) or 75  minutes of vigorous exercise (such as running) each week.  Where do I find calorie information?    The number of calories in a food can be found on a Nutrition Facts label. If a food does not have a Nutrition Facts label, try to look up the calories online or ask your dietitian for help.  Remember that calories are listed per serving. If you choose to have more than one serving of a food, you will have to multiply the calories per serving by the amount of servings you plan to eat. For example, the label on a package of bread might say that a serving size is 1 slice and that there are 90 calories in a serving. If you eat 1 slice, you will have eaten 90 calories. If you eat 2 slices, you will have eaten 180 calories.  How do I keep a food log?  Immediately after each meal, record the following information in your food log:  · What you ate. Don't forget to include toppings, sauces, and other extras on the food.  · How much you ate. This can be measured in cups, ounces, or number of items.  · How many calories each food and drink had.  · The total number of calories in the meal.  Keep your food log near you, such as in a small notebook in your pocket, or use a mobile glendy or website. Some programs will calculate calories for you and show you how many calories you have left for the day to meet your goal.  What are some calorie counting tips?    · Use your calories on foods and drinks that will fill you up and not leave you hungry:  ? Some examples of foods that fill you up are nuts and nut butters, vegetables, lean proteins, and high-fiber foods like whole grains. High-fiber foods are foods with more than 5 g fiber per serving.  ? Drinks such as sodas, specialty coffee drinks, alcohol, and juices have a lot of calories, yet do not fill you up.  · Eat nutritious foods and avoid empty calories. Empty calories are calories you get from foods or beverages that do not have many vitamins or protein, such as candy, sweets, and  "soda. It is better to have a nutritious high-calorie food (such as an avocado) than a food with few nutrients (such as a bag of chips).  · Know how many calories are in the foods you eat most often. This will help you calculate calorie counts faster.  · Pay attention to calories in drinks. Low-calorie drinks include water and unsweetened drinks.  · Pay attention to nutrition labels for \"low fat\" or \"fat free\" foods. These foods sometimes have the same amount of calories or more calories than the full fat versions. They also often have added sugar, starch, or salt, to make up for flavor that was removed with the fat.  · Find a way of tracking calories that works for you. Get creative. Try different apps or programs if writing down calories does not work for you.  What are some portion control tips?  · Know how many calories are in a serving. This will help you know how many servings of a certain food you can have.  · Use a measuring cup to measure serving sizes. You could also try weighing out portions on a kitchen scale. With time, you will be able to estimate serving sizes for some foods.  · Take some time to put servings of different foods on your favorite plates, bowls, and cups so you know what a serving looks like.  · Try not to eat straight from a bag or box. Doing this can lead to overeating. Put the amount you would like to eat in a cup or on a plate to make sure you are eating the right portion.  · Use smaller plates, glasses, and bowls to prevent overeating.  · Try not to multitask (for example, watch TV or use your computer) while eating. If it is time to eat, sit down at a table and enjoy your food. This will help you to know when you are full. It will also help you to be aware of what you are eating and how much you are eating.  What are tips for following this plan?  Reading food labels  · Check the calorie count compared to the serving size. The serving size may be smaller than what you are used to " "eating.  · Check the source of the calories. Make sure the food you are eating is high in vitamins and protein and low in saturated and trans fats.  Shopping  · Read nutrition labels while you shop. This will help you make healthy decisions before you decide to purchase your food.  · Make a grocery list and stick to it.  Cooking  · Try to cook your favorite foods in a healthier way. For example, try baking instead of frying.  · Use low-fat dairy products.  Meal planning  · Use more fruits and vegetables. Half of your plate should be fruits and vegetables.  · Include lean proteins like poultry and fish.  How do I count calories when eating out?  · Ask for smaller portion sizes.  · Consider sharing an entree and sides instead of getting your own entree.  · If you get your own entree, eat only half. Ask for a box at the beginning of your meal and put the rest of your entree in it so you are not tempted to eat it.  · If calories are listed on the menu, choose the lower calorie options.  · Choose dishes that include vegetables, fruits, whole grains, low-fat dairy products, and lean protein.  · Choose items that are boiled, broiled, grilled, or steamed. Stay away from items that are buttered, battered, fried, or served with cream sauce. Items labeled \"crispy\" are usually fried, unless stated otherwise.  · Choose water, low-fat milk, unsweetened iced tea, or other drinks without added sugar. If you want an alcoholic beverage, choose a lower calorie option such as a glass of wine or light beer.  · Ask for dressings, sauces, and syrups on the side. These are usually high in calories, so you should limit the amount you eat.  · If you want a salad, choose a garden salad and ask for grilled meats. Avoid extra toppings like romero, cheese, or fried items. Ask for the dressing on the side, or ask for olive oil and vinegar or lemon to use as dressing.  · Estimate how many servings of a food you are given. For example, a serving of " cooked rice is ½ cup or about the size of half a baseball. Knowing serving sizes will help you be aware of how much food you are eating at restaurants. The list below tells you how big or small some common portion sizes are based on everyday objects:  ? 1 oz--4 stacked dice.  ? 3 oz--1 deck of cards.  ? 1 tsp--1 die.  ? 1 Tbsp--½ a ping-pong ball.  ? 2 Tbsp--1 ping-pong ball.  ? ½ cup--½ baseball.  ? 1 cup--1 baseball.  Summary  · Calorie counting means keeping track of how many calories you eat and drink each day. If you eat fewer calories than your body needs, you should lose weight.  · A healthy amount of weight to lose per week is usually 1-2 lb (0.5-0.9 kg). This usually means reducing your daily calorie intake by 500-750 calories.  · The number of calories in a food can be found on a Nutrition Facts label. If a food does not have a Nutrition Facts label, try to look up the calories online or ask your dietitian for help.  · Use your calories on foods and drinks that will fill you up, and not on foods and drinks that will leave you hungry.  · Use smaller plates, glasses, and bowls to prevent overeating.  This information is not intended to replace advice given to you by your health care provider. Make sure you discuss any questions you have with your health care provider.  Document Released: 12/18/2006 Document Revised: 09/06/2019 Document Reviewed: 11/17/2017  [employer] Patient Education © 2020 [employer]      Exercising to Lose Weight  Exercise is structured, repetitive physical activity to improve fitness and health. Getting regular exercise is important for everyone. It is especially important if you are overweight. Being overweight increases your risk of heart disease, stroke, diabetes, high blood pressure, and several types of cancer. Reducing your calorie intake and exercising can help you lose weight.  Exercise is usually categorized as moderate or vigorous intensity. To lose weight, most people need  to do a certain amount of moderate-intensity or vigorous-intensity exercise each week.  Moderate-intensity exercise    Moderate-intensity exercise is any activity that gets you moving enough to burn at least three times more energy (calories) than if you were sitting.  Examples of moderate exercise include:  · Walking a mile in 15 minutes.  · Doing light yard work.  · Biking at an easy pace.  Most people should get at least 150 minutes (2 hours and 30 minutes) a week of moderate-intensity exercise to maintain their body weight.  Vigorous-intensity exercise  Vigorous-intensity exercise is any activity that gets you moving enough to burn at least six times more calories than if you were sitting. When you exercise at this intensity, you should be working hard enough that you are not able to carry on a conversation.  Examples of vigorous exercise include:  · Running.  · Playing a team sport, such as football, basketball, and soccer.  · Jumping rope.  Most people should get at least 75 minutes (1 hour and 15 minutes) a week of vigorous-intensity exercise to maintain their body weight.  How can exercise affect me?  When you exercise enough to burn more calories than you eat, you lose weight. Exercise also reduces body fat and builds muscle. The more muscle you have, the more calories you burn. Exercise also:  · Improves mood.  · Reduces stress and tension.  · Improves your overall fitness, flexibility, and endurance.  · Increases bone strength.  The amount of exercise you need to lose weight depends on:  · Your age.  · The type of exercise.  · Any health conditions you have.  · Your overall physical ability.  Talk to your health care provider about how much exercise you need and what types of activities are safe for you.  What actions can I take to lose weight?  Nutrition    · Make changes to your diet as told by your health care provider or diet and nutrition specialist (dietitian). This may include:  ? Eating fewer  calories.  ? Eating more protein.  ? Eating less unhealthy fats.  ? Eating a diet that includes fresh fruits and vegetables, whole grains, low-fat dairy products, and lean protein.  ? Avoiding foods with added fat, salt, and sugar.  · Drink plenty of water while you exercise to prevent dehydration or heat stroke.  Activity  · Choose an activity that you enjoy and set realistic goals. Your health care provider can help you make an exercise plan that works for you.  · Exercise at a moderate or vigorous intensity most days of the week.  ? The intensity of exercise may vary from person to person. You can tell how intense a workout is for you by paying attention to your breathing and heartbeat. Most people will notice their breathing and heartbeat get faster with more intense exercise.  · Do resistance training twice each week, such as:  ? Push-ups.  ? Sit-ups.  ? Lifting weights.  ? Using resistance bands.  · Getting short amounts of exercise can be just as helpful as long structured periods of exercise. If you have trouble finding time to exercise, try to include exercise in your daily routine.  ? Get up, stretch, and walk around every 30 minutes throughout the day.  ? Go for a walk during your lunch break.  ? Park your car farther away from your destination.  ? If you take public transportation, get off one stop early and walk the rest of the way.  ? Make phone calls while standing up and walking around.  ? Take the stairs instead of elevators or escalators.  · Wear comfortable clothes and shoes with good support.  · Do not exercise so much that you hurt yourself, feel dizzy, or get very short of breath.  Where to find more information  · [place] Department of Health and Human Services: www.hhs.gov  · Centers for Disease Control and Prevention (CDC): www.cdc.gov  Contact a health care provider:  · Before starting a new exercise program.  · If you have questions or concerns about your weight.  · If you have a medical  problem that keeps you from exercising.  Get help right away if you have any of the following while exercising:  · Injury.  · Dizziness.  · Difficulty breathing or shortness of breath that does not go away when you stop exercising.  · Chest pain.  · Rapid heartbeat.  Summary  · Being overweight increases your risk of heart disease, stroke, diabetes, high blood pressure, and several types of cancer.  · Losing weight happens when you burn more calories than you eat.  · Reducing the amount of calories you eat in addition to getting regular moderate or vigorous exercise each week helps you lose weight.  This information is not intended to replace advice given to you by your health care provider. Make sure you discuss any questions you have with your health care provider.  Document Released: 01/20/2012 Document Revised: 12/31/2018 Document Reviewed: 12/31/2018  [employer] Patient Education © 2020 [employer]

## 2020-08-10 DIAGNOSIS — Z12.31 ENCOUNTER FOR SCREENING MAMMOGRAM FOR MALIGNANT NEOPLASM OF BREAST: ICD-10-CM

## 2020-09-22 ENCOUNTER — FLU SHOT (OUTPATIENT)
Dept: FAMILY MEDICINE CLINIC | Facility: CLINIC | Age: 60
End: 2020-09-22

## 2020-09-22 DIAGNOSIS — Z23 NEED FOR VACCINATION: Primary | ICD-10-CM

## 2020-09-22 PROCEDURE — G0008 ADMIN INFLUENZA VIRUS VAC: HCPCS | Performed by: NURSE PRACTITIONER

## 2020-09-22 PROCEDURE — 90686 IIV4 VACC NO PRSV 0.5 ML IM: CPT | Performed by: NURSE PRACTITIONER

## 2020-11-10 DIAGNOSIS — E78.5 HYPERLIPIDEMIA, UNSPECIFIED HYPERLIPIDEMIA TYPE: ICD-10-CM

## 2020-11-10 NOTE — TELEPHONE ENCOUNTER
Caller: Maggie Mathews    Relationship: Self    Best call back number :[phone] (  Medication needed:   Requested Prescriptions     Pending Prescriptions Disp Refills   • pravastatin (PRAVACHOL) 20 MG tablet 30 tablet 5     Sig: Take 1 tablet by mouth Daily.       When do you need the refill by: 11/10/20    What details did the patient provide when requesting the medication: PATIENT STATED THAT SHE ONLY HAS 4 PILLS LEFT     Does the patient have less than a 3 day supply:  [] Yes  [x] No    What is the patient's preferred pharmacy: [address] [phone] [facility] [phone]

## 2020-11-16 RX ORDER — PRAVASTATIN SODIUM 20 MG
20 TABLET ORAL DAILY
Qty: 30 TABLET | Refills: 1 | Status: SHIPPED | OUTPATIENT
Start: 2020-11-16 | End: 2021-02-09 | Stop reason: SDUPTHER

## 2021-01-28 ENCOUNTER — OFFICE VISIT (OUTPATIENT)
Dept: FAMILY MEDICINE CLINIC | Facility: CLINIC | Age: 61
End: 2021-01-28

## 2021-01-28 ENCOUNTER — LAB (OUTPATIENT)
Dept: LAB | Facility: HOSPITAL | Age: 61
End: 2021-01-28

## 2021-01-28 VITALS
HEIGHT: 62 IN | SYSTOLIC BLOOD PRESSURE: 117 MMHG | BODY MASS INDEX: 29.08 KG/M2 | OXYGEN SATURATION: 98 % | TEMPERATURE: 97.7 F | DIASTOLIC BLOOD PRESSURE: 76 MMHG | WEIGHT: 158 LBS | HEART RATE: 72 BPM | RESPIRATION RATE: 21 BRPM

## 2021-01-28 DIAGNOSIS — K21.9 GASTROESOPHAGEAL REFLUX DISEASE, UNSPECIFIED WHETHER ESOPHAGITIS PRESENT: Primary | ICD-10-CM

## 2021-01-28 DIAGNOSIS — R07.9 CHEST PAIN, UNSPECIFIED TYPE: ICD-10-CM

## 2021-01-28 DIAGNOSIS — I10 ESSENTIAL HYPERTENSION: ICD-10-CM

## 2021-01-28 DIAGNOSIS — E78.5 HYPERLIPIDEMIA, UNSPECIFIED HYPERLIPIDEMIA TYPE: ICD-10-CM

## 2021-01-28 PROCEDURE — 86677 HELICOBACTER PYLORI ANTIBODY: CPT | Performed by: NURSE PRACTITIONER

## 2021-01-28 PROCEDURE — 93010 ELECTROCARDIOGRAM REPORT: CPT | Performed by: INTERNAL MEDICINE

## 2021-01-28 PROCEDURE — 99214 OFFICE O/P EST MOD 30 MIN: CPT | Performed by: NURSE PRACTITIONER

## 2021-01-28 PROCEDURE — 93005 ELECTROCARDIOGRAM TRACING: CPT | Performed by: NURSE PRACTITIONER

## 2021-01-28 RX ORDER — OMEPRAZOLE 20 MG/1
20 CAPSULE, DELAYED RELEASE ORAL DAILY
Qty: 30 CAPSULE | Refills: 5 | Status: SHIPPED | OUTPATIENT
Start: 2021-01-28 | End: 2022-01-10

## 2021-01-28 RX ORDER — PRAVASTATIN SODIUM 20 MG
20 TABLET ORAL DAILY
Qty: 30 TABLET | Refills: 5 | Status: SHIPPED | OUTPATIENT
Start: 2021-01-28 | End: 2021-05-07 | Stop reason: SDUPTHER

## 2021-01-29 LAB
H PYLORI IGA SER-ACNC: <9 UNITS (ref 0–8.9)
H PYLORI IGG SER IA-ACNC: 0.52 INDEX VALUE (ref 0–0.79)
QT INTERVAL: 414 MS
QTC INTERVAL: 396 MS

## 2021-02-02 ENCOUNTER — TELEPHONE (OUTPATIENT)
Dept: FAMILY MEDICINE CLINIC | Facility: CLINIC | Age: 61
End: 2021-02-02

## 2021-02-02 NOTE — TELEPHONE ENCOUNTER
----- Message from MARLY Damon sent at 2/1/2021  3:42 PM CST -----  Her h pylori is negative , find out if she wants to see gastro.

## 2021-02-09 NOTE — PROGRESS NOTES
Subjective   Maggie Mathews is a 60 y.o. female.     Maggie Mathews age 60 comes in today stating that for the past 2 months after she eats she has occasional vomiting sharp upper abdominal pain and she is taken no medications for symptoms.  She did have her gallbladder removed over 3 years ago.  She also has history of hypertension that is well controlled with present medication she needs refills.    Hypertension  This is a chronic problem. The current episode started more than 1 year ago. The problem is unchanged. The problem is controlled. Pertinent negatives include no anxiety, blurred vision, chest pain, headaches, malaise/fatigue, neck pain, orthopnea, palpitations, peripheral edema, PND, shortness of breath or sweats. Agents associated with hypertension include NSAIDs. Risk factors for coronary artery disease include post-menopausal state and sedentary lifestyle. Past treatments include beta blockers. Current antihypertension treatment includes beta blockers. The current treatment provides significant improvement. There are no compliance problems.  There is no history of angina, kidney disease, CAD/MI, CVA, heart failure, left ventricular hypertrophy, PVD or retinopathy.   Abdominal Pain  This is a new problem. The current episode started more than 1 month ago. The onset quality is undetermined. Episode frequency: just after eating. The problem has been unchanged. The pain is located in the epigastric region. The pain is at a severity of 3/10. The pain is mild. The quality of the pain is sharp. The abdominal pain does not radiate. Associated symptoms include nausea and vomiting. Pertinent negatives include no anorexia, arthralgias, belching, constipation, diarrhea, dysuria, fever, flatus, frequency, headaches, hematochezia, hematuria, melena, myalgias or weight loss. Associated symptoms comments: nausea. The pain is aggravated by eating. The pain is relieved by nothing. The treatment provided no relief. Her  past medical history is significant for gallstones.        The following portions of the patient's history were reviewed and updated as appropriate: allergies, current medications, past family history, past medical history, past social history, past surgical history and problem list.    Review of Systems   Constitutional: Negative.  Negative for fever, malaise/fatigue and weight loss.   HENT: Negative.    Eyes: Negative.  Negative for blurred vision.   Respiratory: Negative.  Negative for shortness of breath.    Cardiovascular: Negative.  Negative for chest pain, palpitations, orthopnea and PND.   Gastrointestinal: Positive for abdominal pain, nausea and vomiting. Negative for anorexia, constipation, diarrhea, flatus, hematochezia and melena.   Genitourinary: Negative.  Negative for dysuria, frequency and hematuria.   Musculoskeletal: Negative.  Negative for arthralgias, myalgias and neck pain.   Skin: Negative.    Neurological: Negative.  Negative for headaches.   Psychiatric/Behavioral: Negative.        Objective   Physical Exam  Vitals signs and nursing note reviewed.   Constitutional:       General: She is not in acute distress.     Appearance: She is well-developed.   HENT:      Head: Normocephalic and atraumatic.      Right Ear: External ear normal.      Left Ear: External ear normal.      Nose: Nose normal.      Mouth/Throat:      Pharynx: No oropharyngeal exudate.   Eyes:      Pupils: Pupils are equal, round, and reactive to light.   Neck:      Musculoskeletal: Normal range of motion and neck supple.      Thyroid: No thyromegaly.   Cardiovascular:      Rate and Rhythm: Normal rate and regular rhythm.      Heart sounds: Normal heart sounds. No murmur. No friction rub.      Comments: KG is done today and shows sinus bradycardia otherwise normal EKG.  Her ventricular rate is 55 bpm  Pulmonary:      Effort: Pulmonary effort is normal. No respiratory distress.      Breath sounds: Normal breath sounds. No wheezing  or rales.   Abdominal:      General: Abdomen is flat. Bowel sounds are normal.      Palpations: Abdomen is soft.      Tenderness: There is abdominal tenderness (mild epigastric tenderness with palpation).   Musculoskeletal: Normal range of motion.   Skin:     General: Skin is warm and dry.   Neurological:      Mental Status: She is alert and oriented to person, place, and time.   Psychiatric:         Thought Content: Thought content normal.         Assessment/Plan   Diagnoses and all orders for this visit:    1. Gastroesophageal reflux disease, unspecified whether esophagitis present (Primary)  -     omeprazole (PrilOSEC) 20 MG capsule; Take 1 capsule by mouth Daily.  Dispense: 30 capsule; Refill: 5  -     H.pylori,IgG / IgA Antibodies    2. Chest pain, unspecified type  -     ECG 12 Lead    3. Essential hypertension  -     metoprolol tartrate (LOPRESSOR) 25 MG tablet; Take 1 tablet by mouth 2 (Two) Times a Day.  Dispense: 60 tablet; Refill: 5    4. Hyperlipidemia, unspecified hyperlipidemia type  -     pravastatin (PRAVACHOL) 20 MG tablet; Take 1 tablet by mouth Daily.  Dispense: 30 tablet; Refill: 5      bmi noted to be 28.9 which is considered overweight.  Diet and exercise information will be provided the patient.

## 2021-02-09 NOTE — PATIENT INSTRUCTIONS
Calorie Counting for Weight Loss  Calories are units of energy. Your body needs a certain amount of calories from food to keep you going throughout the day. When you eat more calories than your body needs, your body stores the extra calories as fat. When you eat fewer calories than your body needs, your body burns fat to get the energy it needs.  Calorie counting means keeping track of how many calories you eat and drink each day. Calorie counting can be helpful if you need to lose weight. If you make sure to eat fewer calories than your body needs, you should lose weight. Ask your health care provider what a healthy weight is for you.  For calorie counting to work, you will need to eat the right number of calories in a day in order to lose a healthy amount of weight per week. A dietitian can help you determine how many calories you need in a day and will give you suggestions on how to reach your calorie goal.  · A healthy amount of weight to lose per week is usually 1-2 lb (0.5-0.9 kg). This usually means that your daily calorie intake should be reduced by 500-750 calories.  · Eating 1,200 - 1,500 calories per day can help most women lose weight.  · Eating 1,500 - 1,800 calories per day can help most men lose weight.  What is my plan?  My goal is to have __________ calories per day.  If I have this many calories per day, I should lose around __________ pounds per week.  What do I need to know about calorie counting?  In order to meet your daily calorie goal, you will need to:  · Find out how many calories are in each food you would like to eat. Try to do this before you eat.  · Decide how much of the food you plan to eat.  · Write down what you ate and how many calories it had. Doing this is called keeping a food log.  To successfully lose weight, it is important to balance calorie counting with a healthy lifestyle that includes regular activity. Aim for 150 minutes of moderate exercise (such as walking) or 75  minutes of vigorous exercise (such as running) each week.  Where do I find calorie information?    The number of calories in a food can be found on a Nutrition Facts label. If a food does not have a Nutrition Facts label, try to look up the calories online or ask your dietitian for help.  Remember that calories are listed per serving. If you choose to have more than one serving of a food, you will have to multiply the calories per serving by the amount of servings you plan to eat. For example, the label on a package of bread might say that a serving size is 1 slice and that there are 90 calories in a serving. If you eat 1 slice, you will have eaten 90 calories. If you eat 2 slices, you will have eaten 180 calories.  How do I keep a food log?  Immediately after each meal, record the following information in your food log:  · What you ate. Don't forget to include toppings, sauces, and other extras on the food.  · How much you ate. This can be measured in cups, ounces, or number of items.  · How many calories each food and drink had.  · The total number of calories in the meal.  Keep your food log near you, such as in a small notebook in your pocket, or use a mobile glendy or website. Some programs will calculate calories for you and show you how many calories you have left for the day to meet your goal.  What are some calorie counting tips?    · Use your calories on foods and drinks that will fill you up and not leave you hungry:  ? Some examples of foods that fill you up are nuts and nut butters, vegetables, lean proteins, and high-fiber foods like whole grains. High-fiber foods are foods with more than 5 g fiber per serving.  ? Drinks such as sodas, specialty coffee drinks, alcohol, and juices have a lot of calories, yet do not fill you up.  · Eat nutritious foods and avoid empty calories. Empty calories are calories you get from foods or beverages that do not have many vitamins or protein, such as candy, sweets, and  "soda. It is better to have a nutritious high-calorie food (such as an avocado) than a food with few nutrients (such as a bag of chips).  · Know how many calories are in the foods you eat most often. This will help you calculate calorie counts faster.  · Pay attention to calories in drinks. Low-calorie drinks include water and unsweetened drinks.  · Pay attention to nutrition labels for \"low fat\" or \"fat free\" foods. These foods sometimes have the same amount of calories or more calories than the full fat versions. They also often have added sugar, starch, or salt, to make up for flavor that was removed with the fat.  · Find a way of tracking calories that works for you. Get creative. Try different apps or programs if writing down calories does not work for you.  What are some portion control tips?  · Know how many calories are in a serving. This will help you know how many servings of a certain food you can have.  · Use a measuring cup to measure serving sizes. You could also try weighing out portions on a kitchen scale. With time, you will be able to estimate serving sizes for some foods.  · Take some time to put servings of different foods on your favorite plates, bowls, and cups so you know what a serving looks like.  · Try not to eat straight from a bag or box. Doing this can lead to overeating. Put the amount you would like to eat in a cup or on a plate to make sure you are eating the right portion.  · Use smaller plates, glasses, and bowls to prevent overeating.  · Try not to multitask (for example, watch TV or use your computer) while eating. If it is time to eat, sit down at a table and enjoy your food. This will help you to know when you are full. It will also help you to be aware of what you are eating and how much you are eating.  What are tips for following this plan?  Reading food labels  · Check the calorie count compared to the serving size. The serving size may be smaller than what you are used to " "eating.  · Check the source of the calories. Make sure the food you are eating is high in vitamins and protein and low in saturated and trans fats.  Shopping  · Read nutrition labels while you shop. This will help you make healthy decisions before you decide to purchase your food.  · Make a grocery list and stick to it.  Cooking  · Try to cook your favorite foods in a healthier way. For example, try baking instead of frying.  · Use low-fat dairy products.  Meal planning  · Use more fruits and vegetables. Half of your plate should be fruits and vegetables.  · Include lean proteins like poultry and fish.  How do I count calories when eating out?  · Ask for smaller portion sizes.  · Consider sharing an entree and sides instead of getting your own entree.  · If you get your own entree, eat only half. Ask for a box at the beginning of your meal and put the rest of your entree in it so you are not tempted to eat it.  · If calories are listed on the menu, choose the lower calorie options.  · Choose dishes that include vegetables, fruits, whole grains, low-fat dairy products, and lean protein.  · Choose items that are boiled, broiled, grilled, or steamed. Stay away from items that are buttered, battered, fried, or served with cream sauce. Items labeled \"crispy\" are usually fried, unless stated otherwise.  · Choose water, low-fat milk, unsweetened iced tea, or other drinks without added sugar. If you want an alcoholic beverage, choose a lower calorie option such as a glass of wine or light beer.  · Ask for dressings, sauces, and syrups on the side. These are usually high in calories, so you should limit the amount you eat.  · If you want a salad, choose a garden salad and ask for grilled meats. Avoid extra toppings like romero, cheese, or fried items. Ask for the dressing on the side, or ask for olive oil and vinegar or lemon to use as dressing.  · Estimate how many servings of a food you are given. For example, a serving of " cooked rice is ½ cup or about the size of half a baseball. Knowing serving sizes will help you be aware of how much food you are eating at restaurants. The list below tells you how big or small some common portion sizes are based on everyday objects:  ? 1 oz--4 stacked dice.  ? 3 oz--1 deck of cards.  ? 1 tsp--1 die.  ? 1 Tbsp--½ a ping-pong ball.  ? 2 Tbsp--1 ping-pong ball.  ? ½ cup--½ baseball.  ? 1 cup--1 baseball.  Summary  · Calorie counting means keeping track of how many calories you eat and drink each day. If you eat fewer calories than your body needs, you should lose weight.  · A healthy amount of weight to lose per week is usually 1-2 lb (0.5-0.9 kg). This usually means reducing your daily calorie intake by 500-750 calories.  · The number of calories in a food can be found on a Nutrition Facts label. If a food does not have a Nutrition Facts label, try to look up the calories online or ask your dietitian for help.  · Use your calories on foods and drinks that will fill you up, and not on foods and drinks that will leave you hungry.  · Use smaller plates, glasses, and bowls to prevent overeating.  This information is not intended to replace advice given to you by your health care provider. Make sure you discuss any questions you have with your health care provider.  Document Revised: 09/06/2019 Document Reviewed: 11/17/2017  [employer] Patient Education © 2020 [employer]      Exercising to Lose Weight  Exercise is structured, repetitive physical activity to improve fitness and health. Getting regular exercise is important for everyone. It is especially important if you are overweight. Being overweight increases your risk of heart disease, stroke, diabetes, high blood pressure, and several types of cancer. Reducing your calorie intake and exercising can help you lose weight.  Exercise is usually categorized as moderate or vigorous intensity. To lose weight, most people need to do a certain amount of  moderate-intensity or vigorous-intensity exercise each week.  Moderate-intensity exercise    Moderate-intensity exercise is any activity that gets you moving enough to burn at least three times more energy (calories) than if you were sitting.  Examples of moderate exercise include:  · Walking a mile in 15 minutes.  · Doing light yard work.  · Biking at an easy pace.  Most people should get at least 150 minutes (2 hours and 30 minutes) a week of moderate-intensity exercise to maintain their body weight.  Vigorous-intensity exercise  Vigorous-intensity exercise is any activity that gets you moving enough to burn at least six times more calories than if you were sitting. When you exercise at this intensity, you should be working hard enough that you are not able to carry on a conversation.  Examples of vigorous exercise include:  · Running.  · Playing a team sport, such as football, basketball, and soccer.  · Jumping rope.  Most people should get at least 75 minutes (1 hour and 15 minutes) a week of vigorous-intensity exercise to maintain their body weight.  How can exercise affect me?  When you exercise enough to burn more calories than you eat, you lose weight. Exercise also reduces body fat and builds muscle. The more muscle you have, the more calories you burn. Exercise also:  · Improves mood.  · Reduces stress and tension.  · Improves your overall fitness, flexibility, and endurance.  · Increases bone strength.  The amount of exercise you need to lose weight depends on:  · Your age.  · The type of exercise.  · Any health conditions you have.  · Your overall physical ability.  Talk to your health care provider about how much exercise you need and what types of activities are safe for you.  What actions can I take to lose weight?  Nutrition    · Make changes to your diet as told by your health care provider or diet and nutrition specialist (dietitian). This may include:  ? Eating fewer calories.  ? Eating more  protein.  ? Eating less unhealthy fats.  ? Eating a diet that includes fresh fruits and vegetables, whole grains, low-fat dairy products, and lean protein.  ? Avoiding foods with added fat, salt, and sugar.  · Drink plenty of water while you exercise to prevent dehydration or heat stroke.  Activity  · Choose an activity that you enjoy and set realistic goals. Your health care provider can help you make an exercise plan that works for you.  · Exercise at a moderate or vigorous intensity most days of the week.  ? The intensity of exercise may vary from person to person. You can tell how intense a workout is for you by paying attention to your breathing and heartbeat. Most people will notice their breathing and heartbeat get faster with more intense exercise.  · Do resistance training twice each week, such as:  ? Push-ups.  ? Sit-ups.  ? Lifting weights.  ? Using resistance bands.  · Getting short amounts of exercise can be just as helpful as long structured periods of exercise. If you have trouble finding time to exercise, try to include exercise in your daily routine.  ? Get up, stretch, and walk around every 30 minutes throughout the day.  ? Go for a walk during your lunch break.  ? Park your car farther away from your destination.  ? If you take public transportation, get off one stop early and walk the rest of the way.  ? Make phone calls while standing up and walking around.  ? Take the stairs instead of elevators or escalators.  · Wear comfortable clothes and shoes with good support.  · Do not exercise so much that you hurt yourself, feel dizzy, or get very short of breath.  Where to find more information  · [place] Department of Health and Human Services: www.hhs.gov  · Centers for Disease Control and Prevention (CDC): www.cdc.gov  Contact a health care provider:  · Before starting a new exercise program.  · If you have questions or concerns about your weight.  · If you have a medical problem that keeps you from  exercising.  Get help right away if you have any of the following while exercising:  · Injury.  · Dizziness.  · Difficulty breathing or shortness of breath that does not go away when you stop exercising.  · Chest pain.  · Rapid heartbeat.  Summary  · Being overweight increases your risk of heart disease, stroke, diabetes, high blood pressure, and several types of cancer.  · Losing weight happens when you burn more calories than you eat.  · Reducing the amount of calories you eat in addition to getting regular moderate or vigorous exercise each week helps you lose weight.  This information is not intended to replace advice given to you by your health care provider. Make sure you discuss any questions you have with your health care provider.  Document Revised: 12/31/2018 Document Reviewed: 12/31/2018  [employer] Patient Education © 2020 [employer]

## 2021-05-07 ENCOUNTER — OFFICE VISIT (OUTPATIENT)
Dept: FAMILY MEDICINE CLINIC | Facility: CLINIC | Age: 61
End: 2021-05-07

## 2021-05-07 VITALS
RESPIRATION RATE: 22 BRPM | HEART RATE: 64 BPM | OXYGEN SATURATION: 97 % | HEIGHT: 62 IN | TEMPERATURE: 98 F | WEIGHT: 163.6 LBS | BODY MASS INDEX: 30.11 KG/M2 | SYSTOLIC BLOOD PRESSURE: 110 MMHG | DIASTOLIC BLOOD PRESSURE: 64 MMHG

## 2021-05-07 DIAGNOSIS — M25.561 CHRONIC PAIN OF RIGHT KNEE: Chronic | ICD-10-CM

## 2021-05-07 DIAGNOSIS — E78.5 HYPERLIPIDEMIA, UNSPECIFIED HYPERLIPIDEMIA TYPE: Chronic | ICD-10-CM

## 2021-05-07 DIAGNOSIS — I10 ESSENTIAL HYPERTENSION: Primary | Chronic | ICD-10-CM

## 2021-05-07 DIAGNOSIS — Z12.11 COLON CANCER SCREENING: ICD-10-CM

## 2021-05-07 DIAGNOSIS — G89.29 CHRONIC PAIN OF RIGHT KNEE: Chronic | ICD-10-CM

## 2021-05-07 PROCEDURE — 99214 OFFICE O/P EST MOD 30 MIN: CPT | Performed by: NURSE PRACTITIONER

## 2021-05-07 RX ORDER — PRAVASTATIN SODIUM 20 MG
20 TABLET ORAL DAILY
Qty: 30 TABLET | Refills: 1 | Status: SHIPPED | OUTPATIENT
Start: 2021-05-07 | End: 2021-07-08 | Stop reason: SDUPTHER

## 2021-05-07 NOTE — PROGRESS NOTES
"Chief Complaint  Med Refill    Subjective          Maggie Mathews presents to [facility]    [facility]    PCP: Arnold (retired)--appt to establish with MARLY Dill on 7/8/2021    CC: \"refills\"    HTN: Currently on Lopressor for blood pressure and tolerating well. Denies frequent headaches, dizziness, blurred vision, chest pain or dyspnea.  Denies any peripheral edema.  CMP and Lipids in July 2020.    Hyperlipidemia: Currently on Pravastatin and tolerating without problems.  Denies leg cramps.  Lipids and CMP in July 2020.    No screening labs in several years.  Mammogram July 2020.  Pap in 2019.  No colon screening--reports she was scheduled years ago for a colonoscopy, but wasn't \"cleaned out enough\" and instructed to return and never did.  Denies family history of colorectal cancer.      Sees mental health and [facility] Rx for Celexa, Risperdal from them    Knee Pain   The incident occurred more than 1 week ago (several months). The incident occurred at home. There was no injury mechanism. The pain is present in the right knee. The quality of the pain is described as aching (stiffness). The pain is mild. The pain has been fluctuating since onset. Associated symptoms include a loss of motion (feels like it wants to give out at time). Pertinent negatives include no inability to bear weight, loss of sensation, muscle weakness, numbness or tingling. She reports no foreign bodies present. The symptoms are aggravated by weight bearing. She has tried nothing for the symptoms. The treatment provided no relief.       Review of Systems   Constitutional: Negative.    HENT: Negative.    Respiratory: Negative.    Cardiovascular: Negative.    Gastrointestinal: Negative.    Genitourinary: Negative.    Musculoskeletal: Positive for arthralgias (right knee).   Skin: Negative.    Neurological: Negative for dizziness, tingling, light-headedness, numbness and headaches.    " "    Objective   Vital Signs:   /64 (BP Location: Right arm, Patient Position: Sitting, Cuff Size: Adult)   Pulse 64   Temp 98 °F (36.7 °C)   Resp 22   Ht 157.5 cm (62\")   Wt 74.2 kg (163 lb 9.6 oz)   SpO2 97%   BMI 29.92 kg/m²       Physical Exam  Vitals and nursing note reviewed.   Constitutional:       General: She is not in acute distress.     Appearance: Normal appearance. She is not ill-appearing.   HENT:      Head: Normocephalic and atraumatic.   Cardiovascular:      Rate and Rhythm: Normal rate and regular rhythm.      Comments: No peripheral edema  Pulmonary:      Effort: Pulmonary effort is normal. No respiratory distress.      Breath sounds: Normal breath sounds. No wheezing, rhonchi or rales.   Abdominal:      General: Bowel sounds are normal.      Palpations: Abdomen is soft.      Tenderness: There is no abdominal tenderness.   Musculoskeletal:      Cervical back: Neck supple. No tenderness.      Right knee: No crepitus. Normal range of motion. Tenderness present over the medial joint line and lateral joint line. Normal pulse.   Lymphadenopathy:      Cervical: No cervical adenopathy.   Skin:     General: Skin is warm and dry.   Neurological:      General: No focal deficit present.      Mental Status: She is alert and oriented to person, place, and time.   Psychiatric:         Mood and Affect: Mood normal.         Thought Content: Thought content normal.          Result Review :                 Assessment and Plan    Diagnoses and all orders for this visit:    1. Essential hypertension (Primary)    2. Hyperlipidemia, unspecified hyperlipidemia type  -     pravastatin (PRAVACHOL) 20 MG tablet; Take 1 tablet by mouth Daily.  Dispense: 30 tablet; Refill: 1    3. Colon cancer screening  -     Cologuard - Stool, Per Rectum; Future    4. Chronic pain of right knee  -     XR Knee 4+ View Bilateral      Refill of Pravastatin until f/agvin with new PCP given.   Does not need additional refills " today  Labs/mammogram will be due in July and will discuss with new PCP    Knee xrays today--will notify with results.    Tylenol arthritis as needed  Hinged knee brace recommended if she is going to be up and doing a lot of walking or doing stairs    Return to [facility] Clinic for any concerns prior to appointment with new PCP  See PCP for routine f/u visit and management of chronic medical conditions--appt 7/8/2021      This document has been electronically signed by MARLY Duque on May 7, 2021 17:03 CDT,.

## 2021-05-07 NOTE — PATIENT INSTRUCTIONS
Chronic Knee Pain, Adult  Knee pain that lasts longer than 3 months is called chronic knee pain. You may have pain in one or both knees. Symptoms of chronic knee pain may also include swelling and stiffness. The most common cause is age-related wear and tear (osteoarthritis) of your knee joint. Many conditions can cause chronic knee pain.  Treatment depends on the cause. The main treatments are physical therapy and weight loss. It may also be treated with medicines, injections, a knee sleeve or brace, and by using crutches. Rest, ice, compression (pressure), and elevation (RICE) therapy may also be recommended.  Follow these instructions at home:  If you have a knee sleeve or brace:    · Wear it as told by your doctor. Remove it only as told by your doctor.  · Loosen it if your toes:  ? Tingle.  ? Become numb.  ? Turn cold and blue.  · Keep it clean.  · If the sleeve or brace is not waterproof:  ? Do not let it get wet.  ? Remove it if told by your doctor, or cover it with a watertight covering when you take a bath or shower.  Managing pain, stiffness, and swelling         · If told, put heat on your knee. Do this as often as told by your doctor. Use the heat source that your doctor recommends, such as a moist heat pack or a heating pad.  ? If you have a removable sleeve or brace, remove it as told by your doctor.  ? Place a towel between your skin and the heat source.  ? Leave the heat on for 20-30 minutes.  ? Remove the heat if your skin turns bright red. This is very important if you are unable to feel pain, heat, or cold. You may have a greater risk of getting burned.  · If told, put ice on your knee.  ? If you have a removable sleeve or brace, remove it as told by your doctor.  ? Put ice in a plastic bag.  ? Place a towel between your skin and the bag.  ? Leave the ice on for 20 minutes, 2-3 times a day.  · Move your toes often.  · Raise (elevate) the injured area above the level of your heart while you are  sitting or lying down.  Activity  · Avoid activities where both feet leave the ground at the same time (high-impact activities). Examples are running, jumping rope, and doing jumping jacks.  · Return to your normal activities as told by your doctor. Ask your doctor what activities are safe for you.  · Follow the exercise plan that your doctor makes for you. Your doctor may suggest that you:  ? Avoid activities that make knee pain worse. You may need to change the exercises that you do, the sports that you participate in, or your job duties.  ? Wear shoes with cushioned soles.  ? Avoid high-impact activities or sports that require running and sudden changes in direction.  ? Do exercises or physical therapy as told by your doctor. Physical therapy is planned to match your needs and abilities.  ? Do exercises that increase your balance and strength, such as danielle chi and yoga.  · Do not use your injured knee to support your body weight until your doctor says that you can. Use crutches, a cane, or a walker, as told by your doctor.  General instructions  · Take over-the-counter and prescription medicines only as told by your doctor.  · If you are overweight, work with your doctor and a food expert (dietitian) to set goals to lose weight. Being overweight can make your knee hurt more.  · Do not use any products that contain nicotine or tobacco, such as cigarettes, e-cigarettes, and chewing tobacco. If you need help quitting, ask your doctor.  · Keep all follow-up visits as told by your doctor. This is important.  Contact a doctor if:  · You have knee pain that is not getting better or gets worse.  · You are not able to do your exercises due to knee pain.  Get help right away if:  · Your knee swells and the swelling becomes worse.  · You cannot move your knee.  · You have very bad knee pain.  Summary  · Knee pain that lasts more than 3 months is considered chronic knee pain.  · The main treatments for chronic knee pain are  physical therapy and weight loss. You may also need to take medicines, wear a knee sleeve or brace, use crutches, and put ice or heat on your knee.  · Lose weight if you are overweight. Work with your doctor and a food expert (dietitian) to help you set goals to lose weight. Being overweight can make your knee hurt more.  · Work with a physical therapist to make a safe exercise program, as told by your doctor.  This information is not intended to replace advice given to you by your health care provider. Make sure you discuss any questions you have with your health care provider.  Document Revised: 02/27/2020 Document Reviewed: 02/27/2020  [employer] Patient Education © 2021 [employer]

## 2021-05-10 ENCOUNTER — TELEPHONE (OUTPATIENT)
Dept: FAMILY MEDICINE CLINIC | Facility: CLINIC | Age: 61
End: 2021-05-10

## 2021-07-08 ENCOUNTER — OFFICE VISIT (OUTPATIENT)
Dept: FAMILY MEDICINE CLINIC | Facility: CLINIC | Age: 61
End: 2021-07-08

## 2021-07-08 ENCOUNTER — LAB (OUTPATIENT)
Dept: LAB | Facility: HOSPITAL | Age: 61
End: 2021-07-08

## 2021-07-08 VITALS
HEART RATE: 75 BPM | TEMPERATURE: 98.2 F | DIASTOLIC BLOOD PRESSURE: 70 MMHG | BODY MASS INDEX: 29.37 KG/M2 | SYSTOLIC BLOOD PRESSURE: 112 MMHG | RESPIRATION RATE: 20 BRPM | OXYGEN SATURATION: 100 % | WEIGHT: 159.6 LBS | HEIGHT: 62 IN

## 2021-07-08 DIAGNOSIS — M25.561 CHRONIC PAIN OF RIGHT KNEE: ICD-10-CM

## 2021-07-08 DIAGNOSIS — E55.9 VITAMIN D DEFICIENCY: ICD-10-CM

## 2021-07-08 DIAGNOSIS — E78.5 HYPERLIPIDEMIA, UNSPECIFIED HYPERLIPIDEMIA TYPE: Chronic | ICD-10-CM

## 2021-07-08 DIAGNOSIS — G89.29 CHRONIC PAIN OF RIGHT KNEE: ICD-10-CM

## 2021-07-08 DIAGNOSIS — Z12.31 ENCOUNTER FOR SCREENING MAMMOGRAM FOR MALIGNANT NEOPLASM OF BREAST: ICD-10-CM

## 2021-07-08 DIAGNOSIS — I10 ESSENTIAL HYPERTENSION: ICD-10-CM

## 2021-07-08 DIAGNOSIS — Z13.29 THYROID DISORDER SCREEN: ICD-10-CM

## 2021-07-08 DIAGNOSIS — Z76.89 ENCOUNTER TO ESTABLISH CARE: Primary | ICD-10-CM

## 2021-07-08 DIAGNOSIS — M79.651 RIGHT THIGH PAIN: ICD-10-CM

## 2021-07-08 LAB
25(OH)D3 SERPL-MCNC: 10.1 NG/ML
ALBUMIN SERPL-MCNC: 4.7 G/DL (ref 3.5–5.2)
ALBUMIN/GLOB SERPL: 1.7 G/DL
ALP SERPL-CCNC: 76 U/L (ref 39–117)
ALT SERPL W P-5'-P-CCNC: 10 U/L (ref 1–33)
ANION GAP SERPL CALCULATED.3IONS-SCNC: 11.4 MMOL/L (ref 5–15)
AST SERPL-CCNC: 11 U/L (ref 1–32)
BILIRUB SERPL-MCNC: 0.3 MG/DL (ref 0–1.2)
BUN SERPL-MCNC: 12 MG/DL (ref 8–23)
BUN/CREAT SERPL: 13.3 (ref 7–25)
CALCIUM SPEC-SCNC: 9.4 MG/DL (ref 8.6–10.5)
CHLORIDE SERPL-SCNC: 104 MMOL/L (ref 98–107)
CHOLEST SERPL-MCNC: 212 MG/DL (ref 0–200)
CO2 SERPL-SCNC: 23.6 MMOL/L (ref 22–29)
CREAT SERPL-MCNC: 0.9 MG/DL (ref 0.57–1)
DEPRECATED RDW RBC AUTO: 45.1 FL (ref 37–54)
ERYTHROCYTE [DISTWIDTH] IN BLOOD BY AUTOMATED COUNT: 13 % (ref 12.3–15.4)
GFR SERPL CREATININE-BSD FRML MDRD: 77 ML/MIN/1.73
GLOBULIN UR ELPH-MCNC: 2.8 GM/DL
GLUCOSE SERPL-MCNC: 89 MG/DL (ref 65–99)
HCT VFR BLD AUTO: 40.2 % (ref 34–46.6)
HDLC SERPL-MCNC: 89 MG/DL (ref 40–60)
HGB BLD-MCNC: 13.1 G/DL (ref 12–15.9)
LDLC SERPL CALC-MCNC: 113 MG/DL (ref 0–100)
LDLC/HDLC SERPL: 1.25 {RATIO}
MCH RBC QN AUTO: 30.9 PG (ref 26.6–33)
MCHC RBC AUTO-ENTMCNC: 32.6 G/DL (ref 31.5–35.7)
MCV RBC AUTO: 94.8 FL (ref 79–97)
PLATELET # BLD AUTO: 271 10*3/MM3 (ref 140–450)
PMV BLD AUTO: 10.1 FL (ref 6–12)
POTASSIUM SERPL-SCNC: 3.9 MMOL/L (ref 3.5–5.2)
PROT SERPL-MCNC: 7.5 G/DL (ref 6–8.5)
RBC # BLD AUTO: 4.24 10*6/MM3 (ref 3.77–5.28)
SODIUM SERPL-SCNC: 139 MMOL/L (ref 136–145)
T4 FREE SERPL-MCNC: 1.11 NG/DL (ref 0.93–1.7)
TRIGL SERPL-MCNC: 58 MG/DL (ref 0–150)
TSH SERPL DL<=0.05 MIU/L-ACNC: 1.14 UIU/ML (ref 0.27–4.2)
VLDLC SERPL-MCNC: 10 MG/DL (ref 5–40)
WBC # BLD AUTO: 4.73 10*3/MM3 (ref 3.4–10.8)

## 2021-07-08 PROCEDURE — 85027 COMPLETE CBC AUTOMATED: CPT

## 2021-07-08 PROCEDURE — 80053 COMPREHEN METABOLIC PANEL: CPT

## 2021-07-08 PROCEDURE — 84443 ASSAY THYROID STIM HORMONE: CPT

## 2021-07-08 PROCEDURE — 84439 ASSAY OF FREE THYROXINE: CPT

## 2021-07-08 PROCEDURE — 80061 LIPID PANEL: CPT

## 2021-07-08 PROCEDURE — 82306 VITAMIN D 25 HYDROXY: CPT

## 2021-07-08 PROCEDURE — 99215 OFFICE O/P EST HI 40 MIN: CPT | Performed by: NURSE PRACTITIONER

## 2021-07-08 RX ORDER — CITALOPRAM 20 MG/1
20 TABLET ORAL EVERY MORNING
COMMUNITY
Start: 2021-06-19

## 2021-07-08 RX ORDER — PRAVASTATIN SODIUM 20 MG
20 TABLET ORAL DAILY
Qty: 30 TABLET | Refills: 5 | Status: SHIPPED | OUTPATIENT
Start: 2021-07-08 | End: 2021-07-23 | Stop reason: DRUGHIGH

## 2021-07-08 NOTE — PROGRESS NOTES
Chief Complaint  Establish Care and Leg Pain (right)    Subjective    History of Present Illness {CC  Problem List  Visit  Diagnosis   Encounters  Notes  Medications  Labs  Result Review Imaging  Media :23}     Maggie Mathews presents to [facility] for   Establish care visit and c/o chronic right knee pain that radiates to right thigh. Previous PCP, Acacia Barrett NP - retired - last PCP visit 1/28/21 - H.Pylori lab obtained at same visit that was negative. Pt was seen by JEMAL Lujan NP in walk in clinic on 5/7/21 with Xray of right knee ordered that indicated arthritic changes - was encouraged to wear knee brace - cologuard ordered at same visit that was negative. Noted CMP 7/21/20 normal; lipid - total chol 204, HDL 84, . Pt reports that she sees Kalee Lopes, mental health provider, for mgmt of medication for depression - pt denies SI/HI and states she is doing well. Discussed need for MWV but pt states someone came in her home at the end of last year to do this - thinks it was before Thanksgiving. Voices no other c/o or concerns today.  Leg Pain   There was no injury mechanism. The pain is present in the right thigh and right knee. The pain is moderate. The pain has been intermittent since onset. Associated symptoms include muscle weakness. Pertinent negatives include no inability to bear weight, loss of motion, loss of sensation, numbness or tingling. The symptoms are aggravated by movement, palpation and weight bearing. She has tried rest and acetaminophen for the symptoms. The treatment provided mild relief.   Knee Pain   There was no injury mechanism. The pain is present in the right thigh and right knee. The pain is moderate. The pain has been intermittent since onset. Associated symptoms include muscle weakness. Pertinent negatives include no inability to bear weight, loss of motion, loss of sensation, numbness or tingling. The symptoms are aggravated by  movement, palpation and weight bearing. She has tried acetaminophen for the symptoms. The treatment provided mild relief.        Objective     Physical Exam  Vitals and nursing note reviewed.   Constitutional:       General: She is not in acute distress.     Appearance: Normal appearance. She is normal weight. She is not ill-appearing.   HENT:      Head: Normocephalic and atraumatic.   Eyes:      Conjunctiva/sclera: Conjunctivae normal.      Pupils: Pupils are equal, round, and reactive to light.   Cardiovascular:      Rate and Rhythm: Normal rate and regular rhythm.      Heart sounds: Normal heart sounds.   Pulmonary:      Effort: Pulmonary effort is normal.      Breath sounds: Normal breath sounds.   Musculoskeletal:         General: Swelling (mild - R knee) and tenderness (right thigh and right knee) present. Normal range of motion.      Cervical back: Normal range of motion.      Comments: Mild crepitus with ROM of right knee   Skin:     General: Skin is warm and dry.   Neurological:      General: No focal deficit present.      Mental Status: She is alert and oriented to person, place, and time.   Psychiatric:         Mood and Affect: Mood normal.         Behavior: Behavior normal.        Result Review  Data Reviewed:{ Labs  Result Review  Imaging  Med Tab  Media :23}   The following data was reviewed by (Optional):[id number]}  Common labs    Common Labsle 7/21/20 7/21/20 7/8/21 7/8/21 7/8/21    0930 0930 1059 1059 1059   Glucose  98  89    BUN  11  12    Creatinine  0.98  0.90    eGFR African Am  70  77    Sodium  140  139    Potassium  3.9  3.9    Chloride  104  104    Calcium  9.8  9.4    Albumin  4.40  4.70    Total Bilirubin  0.3  0.3    Alkaline Phosphatase  75  76    AST (SGOT)  15  11    ALT (SGPT)  10  10    WBC   4.73     Hemoglobin   13.1     Hematocrit   40.2     Platelets   271     Total Cholesterol 204 (A)    212 (A)   Triglycerides 59    58   HDL Cholesterol 84 (A)    89 (A)   LDL Cholesterol  108  "(A)    113 (A)   (A) Abnormal value          XR Knee 4+ View Bilateral    Result Date: 5/7/2021  Mild medial compartment narrowing. No acute bony abnormality. Recommend follow-up as clinically warranted. Electronically signed by:  Karl Plata MD  5/7/2021 9:15 PM CDT Workstation: [id number]       Vital Signs:   /70 (BP Location: Right arm, Patient Position: Sitting, Cuff Size: Adult)   Pulse 75   Temp 98.2 °F (36.8 °C) (Temporal)   Resp 20   Ht 157.5 cm (62\")   Wt 72.4 kg (159 lb 9.6 oz)   SpO2 100%   BMI 29.19 kg/m²          Assessment and Plan {CC Problem List  Visit Diagnosis  ROS  Review (Popup)  Health Maintenance  Quality  BestPractice  Medications  SmartSets  SnapShot Encounters  Media :23}   Problem List Items Addressed This Visit        Cardiac and Vasculature    Essential hypertension    Relevant Medications    metoprolol tartrate (LOPRESSOR) 25 MG tablet    Other Relevant Orders    CBC No Differential (Completed)    Comprehensive metabolic panel (Completed)    Hyperlipidemia    Relevant Medications    pravastatin (PRAVACHOL) 20 MG tablet    Other Relevant Orders    Lipid panel (Completed)       Genitourinary and Reproductive     Encounter for screening mammogram for malignant neoplasm of breast     Relevant Orders    Mammo Screening Bilateral With CAD       Musculoskeletal and Injuries    Chronic pain of right knee (Chronic)    Relevant Orders    Ambulatory Referral to Orthopedic Surgery      Other Visit Diagnoses     Encounter to establish care    -  Primary    Right thigh pain        Relevant Orders    Ambulatory Referral to Orthopedic Surgery    Vitamin D deficiency        Relevant Orders    Vitamin D 25 hydroxy (Completed)    Thyroid disorder screen        Relevant Orders    TSH (Completed)    T4, free (Completed)         Diagnosis Plan   1. Encounter to establish care     2. Chronic pain of right knee  Ambulatory Referral to Orthopedic Surgery   3. Right thigh pain  " Ambulatory Referral to Orthopedic Surgery   4. Essential hypertension  metoprolol tartrate (LOPRESSOR) 25 MG tablet    CBC No Differential    Comprehensive metabolic panel   5. Hyperlipidemia, unspecified hyperlipidemia type  pravastatin (PRAVACHOL) 20 MG tablet    Lipid panel   6. Vitamin D deficiency  Vitamin D 25 hydroxy   7. Thyroid disorder screen  TSH    T4, free   8. Encounter for screening mammogram for malignant neoplasm of breast  Mammo Screening Bilateral With CAD      - Est Care visit - review of previous visit notes and lab results noted in HPI  - Chronic right knee pain/right thigh pain - referral to ortho for further evaluation - R knee xray results discussed at visit  - HTN - controlled - cont metoprolol at current dosing - refill submitted - CBC and CMP ordered  - Hyperlipidemia - cont pravastatin at current dosing - refill submitted - lipid panel ordered  - Vit D and thyroid labs ordered for screening purposes.  - Mammogram ordered with future date of 8/10/21  - RTC 6 mos for f/u on chronic issues and end of year for MWV or PRN    I spent 40 minutes caring for Maggie Mathews on this date of service. This time includes time spent by me in the following activities: preparing for the visit, reviewing tests, obtaining and/or reviewing a separately obtained history, performing a medically appropriate examination and/or evaluation, counseling and educating the patient/family/caregiver, ordering medications, tests, or procedures, referring and communicating with other health care professionals, documenting information in the medical record and independently interpreting results and communicating that information with the patient/family/caregiver.     Follow Up {Instructions Charge Capture  Follow-up Communications :23}   Return for Medicare Wellness, Recheck.  Patient was given instructions and counseling regarding her condition or for health maintenance advice. Please see specific information  pulled into the AVS if appropriate            .  This document has been electronically signed by MARLY Daniels on July 8, 2021 22:12 CDT

## 2021-07-18 DIAGNOSIS — E55.9 VITAMIN D DEFICIENCY: Primary | ICD-10-CM

## 2021-07-18 RX ORDER — ERGOCALCIFEROL 1.25 MG/1
50000 CAPSULE ORAL
Qty: 5 CAPSULE | Refills: 4 | Status: SHIPPED | OUTPATIENT
Start: 2021-07-18 | End: 2022-01-11 | Stop reason: SDUPTHER

## 2021-07-23 DIAGNOSIS — E78.5 HYPERLIPIDEMIA, UNSPECIFIED HYPERLIPIDEMIA TYPE: Primary | ICD-10-CM

## 2021-07-23 RX ORDER — PRAVASTATIN SODIUM 40 MG
40 TABLET ORAL DAILY
Qty: 30 TABLET | Refills: 11 | Status: SHIPPED | OUTPATIENT
Start: 2021-07-23 | End: 2022-06-20

## 2021-08-04 ENCOUNTER — TELEPHONE (OUTPATIENT)
Dept: FAMILY MEDICINE CLINIC | Facility: CLINIC | Age: 61
End: 2021-08-04

## 2021-08-04 NOTE — TELEPHONE ENCOUNTER
"Left voicemail for patient to return call regarding her mammogram results.    Per [place] \" Mammogram shows mildly dense tissue, not suspicious for any masses/callifications. Negative mammogram screening. Reccommended to repeat mammogram yearly due to the dense tissue.  "

## 2021-08-05 ENCOUNTER — TELEPHONE (OUTPATIENT)
Dept: FAMILY MEDICINE CLINIC | Facility: CLINIC | Age: 61
End: 2021-08-05

## 2021-08-05 DIAGNOSIS — Z12.31 ENCOUNTER FOR SCREENING MAMMOGRAM FOR MALIGNANT NEOPLASM OF BREAST: ICD-10-CM

## 2021-10-07 ENCOUNTER — OFFICE VISIT (OUTPATIENT)
Dept: ORTHOPEDIC SURGERY | Facility: CLINIC | Age: 61
End: 2021-10-07

## 2021-10-07 VITALS
TEMPERATURE: 98 F | RESPIRATION RATE: 18 BRPM | BODY MASS INDEX: 29.26 KG/M2 | SYSTOLIC BLOOD PRESSURE: 109 MMHG | HEART RATE: 64 BPM | DIASTOLIC BLOOD PRESSURE: 77 MMHG | WEIGHT: 159 LBS | HEIGHT: 62 IN | OXYGEN SATURATION: 98 %

## 2021-10-07 DIAGNOSIS — M16.11 PRIMARY OSTEOARTHRITIS OF RIGHT HIP: ICD-10-CM

## 2021-10-07 DIAGNOSIS — M25.561 CHRONIC PAIN OF RIGHT KNEE: Primary | Chronic | ICD-10-CM

## 2021-10-07 DIAGNOSIS — G89.29 CHRONIC PAIN OF RIGHT KNEE: Primary | Chronic | ICD-10-CM

## 2021-10-07 DIAGNOSIS — M79.604 RIGHT LEG PAIN: ICD-10-CM

## 2021-10-07 PROCEDURE — 99203 OFFICE O/P NEW LOW 30 MIN: CPT | Performed by: ORTHOPAEDIC SURGERY

## 2021-10-07 NOTE — PROGRESS NOTES
Maggie Mathews is a 61 y.o. female   Primary provider:  [place] APRN       Chief Complaint   Patient presents with   • Right Knee - Pain, Initial Evaluation       HISTORY OF PRESENT ILLNESS: This is the first office visit for evaluation of pain in the right thigh.    Ms. Mathews is 61 years old.  She has a 1 year history of pain in the anterior right thigh and buttock.  There is been no trauma and she denies any previous symptoms.  Her pain will occasionally radiate to the knee.  She denies any groin pain.  She has had no popping or swelling at the knee.  Her pain is worse with walking and relieved by sitting.  She denies any back pain and and has had no numbness or tingling in the lower extremities.  Treatment has included activity restriction.    Home medications include Celexa Prilosec respite all and metoprolol among others.  She has no drug allergies.  She does not smoke.  Past medical history is remarkable for seizure disorder and depression.  She is single and unemployed.    MARLY Mendez has asked that I see her for evaluation and treatment.    Pain  This is a chronic problem. The current episode started more than 1 year ago. The problem occurs constantly. The problem has been gradually worsening. Associated symptoms comments: aching. The symptoms are aggravated by walking and standing. She has tried rest for the symptoms. The treatment provided no relief.        CONCURRENT MEDICAL HISTORY:    Past Medical History:   Diagnosis Date   • Abnormal weight loss    • Acute maxillary sinusitis    • Breast lump    • Bronchitis    • Calcific tendinitis of shoulder    • Depression    • Lateral epicondylitis    • Peripheral nervous system disorder    • Seizure disorder (HCC)     Partical complex     • Tachycardia    • Tendinitis     AND/OR tenosynovitis of the elbow region    • Vitamin D deficiency        No Known Allergies      Current Outpatient Medications:   •  citalopram (CeleXA) 20 MG tablet,  "Take 20 mg by mouth Every Morning., Disp: , Rfl:   •  metoprolol tartrate (LOPRESSOR) 25 MG tablet, Take 1 tablet by mouth 2 (Two) Times a Day., Disp: 60 tablet, Rfl: 5  •  omeprazole (PrilOSEC) 20 MG capsule, Take 1 capsule by mouth Daily., Disp: 30 capsule, Rfl: 5  •  pravastatin (Pravachol) 40 MG tablet, Take 1 tablet by mouth Daily., Disp: 30 tablet, Rfl: 11  •  risperiDONE (risperDAL) 1 MG tablet, Take 1 mg by mouth Daily., Disp: , Rfl:   •  vitamin D (ERGOCALCIFEROL) 1.25 MG (84386 UT) capsule capsule, Take 1 capsule by mouth Every 7 (Seven) Days., Disp: 5 capsule, Rfl: 4    Past Surgical History:   Procedure Laterality Date   • ENDOSCOPY AND COLONOSCOPY  03/28/2011    Internal and external hemorrhoids found. Stool found in cecum.   • TUBAL ABDOMINAL LIGATION         Family History   Problem Relation Age of Onset   • Colon cancer Other    • Thyroid disease Other        Social History     Socioeconomic History   • Marital status: Single     Spouse name: Not on file   • Number of children: Not on file   • Years of education: Not on file   • Highest education level: Not on file   Tobacco Use   • Smoking status: Never Smoker   • Smokeless tobacco: Never Used   Substance and Sexual Activity   • Alcohol use: Yes        Review of Systems  Review of systems is positive as noted above.  PHYSICAL EXAMINATION:       Vitals:    10/07/21 0943   BP: 109/77   BP Location: Left arm   Patient Position: Sitting   Cuff Size: Adult   Pulse: 64   Resp: 18   Temp: 98 °F (36.7 °C)   SpO2: 98%   Weight: 72.1 kg (159 lb)   Height: 157.5 cm (62\")   PainSc:   6       Physical Exam she is alert healthy-appearing and in no apparent distress.    GAIT:     [x]  Normal  []  Antalgic    Assistive device: [x]  None  []  Walker     []  Crutches  []  Cane     []  Wheelchair  []  Stretcher    Ortho Exam she walks with a short stride which does not appear to be antalgic in nature.  Alignment of lower extremities is unremarkable.  Leg lengths are " equal.  Motion of the knee is full smooth and painless there is no effusion of the knee.  There is no instability in varus and valgus stress.  Lachman testing is 1+.  Motions of the hip are smooth but painfully restricted with flexion to about 80 degrees.  Internal rotation is 5 to 10 degrees or less with complaints of pain.  External rotation is almost 45 degrees.  Dorsalis pedis pulse is strong.  Sensory exam is intact to soft touch.    Radiographs of the right knee done in May of this year show mild squaring of the medial femoral condyle.  There is a suggestion of slight narrowing of both medial lateral joint spaces.              XR Hip With or Without Pelvis 2 - 3 View Right    Result Date: 10/7/2021  Ordering Provider:  Pj Gaona MD Ordering Diagnosis/Indication:  Right leg pain, Chronic pain of right knee Procedure:  XR HIP W OR WO PELVIS 2-3 VIEW RIGHT Exam Date:  10/7/21 COMPARISON: None      Radiographs of the right hip AP and lateral views done today show mild concentric osteoarthritis.  There is a cystic lesion in the inferior acetabulum likely degenerative in nature. Pj Gaona MD 10/7/21        ASSESSMENT: Osteoarthritis right hip.  The natural history of the disorder and treatment options were reviewed.  She was given the [employer] website.  She was instructed in symptomatic care and use of oral anti-inflammatories and analgesics.  She may advance activities as tolerated.  If her symptoms do not respond to the above measures injection therapy may be of benefit.  She understands hip arthroplasty will likely eventually be needed.  At present I see no surgical indications.    If her symptoms do not respond to the above measures she will call for reevaluation.    Diagnoses and all orders for this visit:    Chronic pain of right knee  -     XR Hip With or Without Pelvis 2 - 3 View Right; Future    Right leg pain  -     XR Hip With or Without Pelvis 2 - 3 View Right; Future    Primary  osteoarthritis of right hip          PLAN    Return if symptoms worsen or fail to improve.    Pj Gaona MD

## 2021-11-01 ENCOUNTER — FLU SHOT (OUTPATIENT)
Dept: FAMILY MEDICINE CLINIC | Facility: CLINIC | Age: 61
End: 2021-11-01

## 2021-11-01 DIAGNOSIS — Z23 ENCOUNTER FOR IMMUNIZATION: Primary | ICD-10-CM

## 2021-11-01 PROCEDURE — 90686 IIV4 VACC NO PRSV 0.5 ML IM: CPT | Performed by: NURSE PRACTITIONER

## 2021-11-01 PROCEDURE — G0008 ADMIN INFLUENZA VIRUS VAC: HCPCS | Performed by: NURSE PRACTITIONER

## 2022-01-07 ENCOUNTER — TELEPHONE (OUTPATIENT)
Dept: FAMILY MEDICINE CLINIC | Facility: CLINIC | Age: 62
End: 2022-01-07

## 2022-01-10 ENCOUNTER — OFFICE VISIT (OUTPATIENT)
Dept: FAMILY MEDICINE CLINIC | Facility: CLINIC | Age: 62
End: 2022-01-10

## 2022-01-10 ENCOUNTER — LAB (OUTPATIENT)
Dept: LAB | Facility: HOSPITAL | Age: 62
End: 2022-01-10

## 2022-01-10 VITALS
HEIGHT: 62 IN | HEART RATE: 86 BPM | WEIGHT: 160.6 LBS | TEMPERATURE: 96.5 F | SYSTOLIC BLOOD PRESSURE: 120 MMHG | BODY MASS INDEX: 29.55 KG/M2 | DIASTOLIC BLOOD PRESSURE: 70 MMHG | OXYGEN SATURATION: 100 % | RESPIRATION RATE: 20 BRPM

## 2022-01-10 DIAGNOSIS — E78.5 HYPERLIPIDEMIA, UNSPECIFIED HYPERLIPIDEMIA TYPE: ICD-10-CM

## 2022-01-10 DIAGNOSIS — I10 ESSENTIAL HYPERTENSION: Primary | ICD-10-CM

## 2022-01-10 DIAGNOSIS — E55.9 VITAMIN D DEFICIENCY: ICD-10-CM

## 2022-01-10 DIAGNOSIS — H00.025 HORDEOLUM INTERNUM OF LEFT LOWER EYELID: ICD-10-CM

## 2022-01-10 PROCEDURE — 80061 LIPID PANEL: CPT

## 2022-01-10 PROCEDURE — 99214 OFFICE O/P EST MOD 30 MIN: CPT | Performed by: NURSE PRACTITIONER

## 2022-01-10 PROCEDURE — 82306 VITAMIN D 25 HYDROXY: CPT

## 2022-01-10 RX ORDER — ERYTHROMYCIN 5 MG/G
OINTMENT OPHTHALMIC NIGHTLY
Qty: 3.5 G | Refills: 0 | Status: SHIPPED | OUTPATIENT
Start: 2022-01-10 | End: 2022-08-18 | Stop reason: SDUPTHER

## 2022-01-10 NOTE — PROGRESS NOTES
Chief Complaint  Hypertension, Hyperlipidemia, and eye complaint    Subjective    History of Present Illness {CC  Problem List  Visit  Diagnosis   Encounters  Notes  Medications  Labs  Result Review Imaging  Media :23}     Maggie Mathews presents to [facility] [place] for   6 mos f/u on HTN and HLP - BP appears well controlled - denies HA, blurry vision, CP, SOA or edema. Does need repeat lipid panel to eval HLP. Pt reports stye type lesion on left lower lid - has been there for a few mos - denies redness, itching, burning or drainage.        Objective     Physical Exam  Vitals and nursing note reviewed.   Constitutional:       General: She is not in acute distress.     Appearance: Normal appearance. She is normal weight. She is not ill-appearing.   HENT:      Head: Normocephalic and atraumatic.   Eyes:      Conjunctiva/sclera: Conjunctivae normal.      Pupils: Pupils are equal, round, and reactive to light.   Cardiovascular:      Rate and Rhythm: Normal rate and regular rhythm.      Heart sounds: Normal heart sounds.   Pulmonary:      Effort: Pulmonary effort is normal.      Breath sounds: Normal breath sounds.   Musculoskeletal:         General: Swelling (mild - R knee) and tenderness (right thigh and right knee) present. Normal range of motion.      Cervical back: Normal range of motion.      Comments: Mild crepitus with ROM of right knee   Skin:     General: Skin is warm and dry.   Neurological:      General: No focal deficit present.      Mental Status: She is alert and oriented to person, place, and time.   Psychiatric:         Mood and Affect: Mood normal.         Behavior: Behavior normal.        Result Review  Data Reviewed:{ Labs  Result Review  Imaging  Med Tab  Media :23}   The following data was reviewed by (Optional):[id number]}  Common labs    Common Labsle 7/8/21 7/8/21 7/8/21    1059 1059 1059   Glucose  89    BUN  12    Creatinine  0.90   "  eGFR African Am  77    Sodium  139    Potassium  3.9    Chloride  104    Calcium  9.4    Albumin  4.70    Total Bilirubin  0.3    Alkaline Phosphatase  76    AST (SGOT)  11    ALT (SGPT)  10    WBC 4.73     Hemoglobin 13.1     Hematocrit 40.2     Platelets 271     Total Cholesterol   212 (A)   Triglycerides   58   HDL Cholesterol   89 (A)   LDL Cholesterol    113 (A)   (A) Abnormal value          XR Hip With or Without Pelvis 2 - 3 View Right    Result Date: 10/7/2021   Radiographs of the right hip AP and lateral views done today show mild concentric osteoarthritis.  There is a cystic lesion in the inferior acetabulum likely degenerative in nature. Pj Gaona MD 10/7/21       Vital Signs:   /70 (BP Location: Right arm, Patient Position: Sitting, Cuff Size: Adult)   Pulse 86   Temp 96.5 °F (35.8 °C) (Temporal)   Resp 20   Ht 157.5 cm (62\")   Wt 72.8 kg (160 lb 9.6 oz)   SpO2 100%   BMI 29.37 kg/m²          Assessment and Plan {CC Problem List  Visit Diagnosis  ROS  Review (Popup)  Health Maintenance  Quality  BestPractice  Medications  SmartSets  SnapShot Encounters  Media :23}   Problem List Items Addressed This Visit        Cardiac and Vasculature    Essential hypertension - Primary    Relevant Medications    metoprolol tartrate (LOPRESSOR) 25 MG tablet    Hyperlipidemia    Relevant Orders    Lipid panel      Other Visit Diagnoses     Hordeolum internum of left lower eyelid        Relevant Medications    erythromycin (ROMYCIN) 5 MG/GM ophthalmic ointment         Diagnosis Plan   1. Essential hypertension  metoprolol tartrate (LOPRESSOR) 25 MG tablet   2. Hyperlipidemia, unspecified hyperlipidemia type  Lipid panel   3. Hordeolum internum of left lower eyelid  erythromycin (ROMYCIN) 5 MG/GM ophthalmic ointment     - HTN - controlled - refill metoprolol 25mg submitted - cont current dosing  - HLP - repeat lipid panel - continue pravastatin 40 mg daily - no refill needed today  - " Hordeolum left lower lid - erythromycin ointment prescribed - use daily at HS  - Advised to obtain Vit D lab - already ordered - will refill Vit D pending result  - AWV scheduled for 1/24/22  - RTC 6 mos for regular f/u or PRN    Follow Up {Instructions Charge Capture  Follow-up Communications :23}   Return in about 6 months (around 7/10/2022), or if symptoms worsen or fail to improve, for Recheck.  Patient was given instructions and counseling regarding her condition or for health maintenance advice. Please see specific information pulled into the AVS if appropriate            .  This document has been electronically signed by MARLY Daniels on January 10, 2022 22:05 CST

## 2022-01-11 ENCOUNTER — TELEPHONE (OUTPATIENT)
Dept: FAMILY MEDICINE CLINIC | Facility: CLINIC | Age: 62
End: 2022-01-11

## 2022-01-11 DIAGNOSIS — E55.9 VITAMIN D DEFICIENCY: ICD-10-CM

## 2022-01-11 LAB
25(OH)D3 SERPL-MCNC: 6.9 NG/ML (ref 30–100)
CHOLEST SERPL-MCNC: 186 MG/DL (ref 0–200)
HDLC SERPL-MCNC: 76 MG/DL (ref 40–60)
LDLC SERPL CALC-MCNC: 100 MG/DL (ref 0–100)
LDLC/HDLC SERPL: 1.31 {RATIO}
TRIGL SERPL-MCNC: 51 MG/DL (ref 0–150)
VLDLC SERPL-MCNC: 10 MG/DL (ref 5–40)

## 2022-01-11 RX ORDER — ERGOCALCIFEROL 1.25 MG/1
50000 CAPSULE ORAL
Qty: 5 CAPSULE | Refills: 4 | Status: SHIPPED | OUTPATIENT
Start: 2022-01-11 | End: 2022-07-11 | Stop reason: SDUPTHER

## 2022-01-11 NOTE — TELEPHONE ENCOUNTER
----- Message from MARLY Daniels sent at 1/11/2022  7:46 AM CST -----  Please notify pt of following  1. Vit D has worsened compared to 6 mos ago - is significantly low at 6.9 want to see around 50 - refill of once weekly high dose vitamin D supplement submitted.  2. Lipid panel much improved - continue pravastatin as prescribed - advise healthy low fat diet.

## 2022-01-11 NOTE — TELEPHONE ENCOUNTER
Spoke with pt to give lab results, recommendations from PCP in regards to meds. Pt voiced understanding.

## 2022-02-07 ENCOUNTER — OFFICE VISIT (OUTPATIENT)
Dept: FAMILY MEDICINE CLINIC | Facility: CLINIC | Age: 62
End: 2022-02-07

## 2022-02-07 VITALS
BODY MASS INDEX: 30.22 KG/M2 | HEIGHT: 62 IN | SYSTOLIC BLOOD PRESSURE: 124 MMHG | OXYGEN SATURATION: 99 % | DIASTOLIC BLOOD PRESSURE: 72 MMHG | HEART RATE: 87 BPM | WEIGHT: 164.2 LBS | TEMPERATURE: 97.1 F

## 2022-02-07 DIAGNOSIS — Z00.00 MEDICARE ANNUAL WELLNESS VISIT, INITIAL: Primary | ICD-10-CM

## 2022-02-07 DIAGNOSIS — E66.9 OBESITY, CLASS I, BMI 30-34.9: ICD-10-CM

## 2022-02-07 PROCEDURE — 1160F RVW MEDS BY RX/DR IN RCRD: CPT | Performed by: NURSE PRACTITIONER

## 2022-02-07 PROCEDURE — 1170F FXNL STATUS ASSESSED: CPT | Performed by: NURSE PRACTITIONER

## 2022-02-07 PROCEDURE — G0438 PPPS, INITIAL VISIT: HCPCS | Performed by: NURSE PRACTITIONER

## 2022-02-07 NOTE — PATIENT INSTRUCTIONS
Medicare Wellness  Personal Prevention Plan of Service     Date of Office Visit:  2022  Encounter Provider:  MARLY Daniels  Place of Service:  [facility] [place]  Patient Name: Maggie Mathews  :  1960    As part of the Medicare Wellness portion of your visit today, we are providing you with this personalized preventive plan of services (PPPS). This plan is based upon recommendations of the [place] Preventive Services Task Force (USPSTF) and the Advisory Committee on Immunization Practices (ACIP).    This lists the preventive care services that should be considered, and provides dates of when you are due. Items listed as completed are up-to-date and do not require any further intervention.    Health Maintenance   Topic Date Due   • ZOSTER VACCINE (1 of 2) 2023 (Originally 3/19/2008)   • COLORECTAL CANCER SCREENING  06/10/2024 (Originally 1960)   • PAP SMEAR  2022   • MAMMOGRAM  2022   • LIPID PANEL  01/10/2023   • ANNUAL WELLNESS VISIT  2023   • TDAP/TD VACCINES (2 - Td or Tdap) 2027   • HEPATITIS C SCREENING  Completed   • COVID-19 Vaccine  Completed   • INFLUENZA VACCINE  Completed   • Pneumococcal Vaccine 0-64  Aged Out       No orders of the defined types were placed in this encounter.      Return in about 1 year (around 2023) for Medicare Wellness.        Healthy Eating  Following a healthy eating pattern may help you to achieve and maintain a healthy body weight, reduce the risk of chronic disease, and live a long and productive life. It is important to follow a healthy eating pattern at an appropriate calorie level for your body. Your nutritional needs should be met primarily through food by choosing a variety of nutrient-rich foods.  What are tips for following this plan?  Reading food labels  · Read labels and choose the following:  ? Reduced or low sodium.  ? Juices with 100% fruit  "juice.  ? Foods with low saturated fats and high polyunsaturated and monounsaturated fats.  ? Foods with whole grains, such as whole wheat, cracked wheat, brown rice, and wild rice.  ? Whole grains that are fortified with folic acid. This is recommended for women who are pregnant or who want to become pregnant.  · Read labels and avoid the following:  ? Foods with a lot of added sugars. These include foods that contain brown sugar, corn sweetener, corn syrup, dextrose, fructose, glucose, high-fructose corn syrup, honey, invert sugar, lactose, malt syrup, maltose, molasses, raw sugar, sucrose, trehalose, or turbinado sugar.  § Do not eat more than the following amounts of added sugar per day:  § 6 teaspoons (25 g) for women.  § 9 teaspoons (38 g) for men.  ? Foods that contain processed or refined starches and grains.  ? Refined grain products, such as white flour, degermed cornmeal, white bread, and white rice.  Shopping  · Choose nutrient-rich snacks, such as vegetables, whole fruits, and nuts. Avoid high-calorie and high-sugar snacks, such as potato chips, fruit snacks, and candy.  · Use oil-based dressings and spreads on foods instead of solid fats such as butter, stick margarine, or cream cheese.  · Limit pre-made sauces, mixes, and \"instant\" products such as flavored rice, instant noodles, and ready-made pasta.  · Try more plant-protein sources, such as tofu, tempeh, black beans, edamame, lentils, nuts, and seeds.  · Explore eating plans such as the Mediterranean diet or vegetarian diet.  Cooking  · Use oil to sauté or stir-vásquez foods instead of solid fats such as butter, stick margarine, or lard.  · Try baking, boiling, grilling, or broiling instead of frying.  · Remove the fatty part of meats before cooking.  · Steam vegetables in water or broth.  Meal planning    · At meals, imagine dividing your plate into fourths:  ? One-half of your plate is fruits and vegetables.  ? One-fourth of your plate is whole " grains.  ? One-fourth of your plate is protein, especially lean meats, poultry, eggs, tofu, beans, or nuts.  · Include low-fat dairy as part of your daily diet.    Lifestyle  · Choose healthy options in all settings, including home, work, school, restaurants, or stores.  · Prepare your food safely:  ? Wash your hands after handling raw meats.  ? Keep food preparation surfaces clean by regularly washing with hot, soapy water.  ? Keep raw meats separate from ready-to-eat foods, such as fruits and vegetables.  ? , meat, poultry, and eggs to the recommended internal temperature.  ? Store foods at safe temperatures. In general:  § Keep cold foods at 40°F (4.4°C) or below.  § Keep hot foods at 140°F (60°C) or above.  § Keep your freezer at 0°F (-17.8°C) or below.  § Foods are no longer safe to eat when they have been between the temperatures of 40°-140°F (4.4-60°C) for more than 2 hours.  What foods should I eat?  Fruits  Aim to eat 2 cup-equivalents of fresh, canned (in natural juice), or frozen fruits each day. Examples of 1 cup-equivalent of fruit include 1 small apple, 8 large strawberries, 1 cup canned fruit, ½ cup dried fruit, or 1 cup 100% juice.  Vegetables  Aim to eat 2½-3 cup-equivalents of fresh and frozen vegetables each day, including different varieties and colors. Examples of 1 cup-equivalent of vegetables include 2 medium carrots, 2 cups raw, leafy greens, 1 cup chopped vegetable (raw or cooked), or 1 medium baked potato.  Grains  Aim to eat 6 ounce-equivalents of whole grains each day. Examples of 1 ounce-equivalent of grains include 1 slice of bread, 1 cup ready-to-eat cereal, 3 cups popcorn, or ½ cup cooked rice, pasta, or cereal.  Meats and other proteins  Aim to eat 5-6 ounce-equivalents of protein each day. Examples of 1 ounce-equivalent of protein include 1 egg, 1/2 cup nuts or seeds, or 1 tablespoon (16 g) peanut butter. A cut of meat or fish that is the size of a deck of cards is about  3-4 ounce-equivalents.  · Of the protein you eat each week, try to have at least 8 ounces come from seafood. This includes salmon, trout, herring, and anchovies.  Dairy  Aim to eat 3 cup-equivalents of fat-free or low-fat dairy each day. Examples of 1 cup-equivalent of dairy include 1 cup (240 mL) milk, 8 ounces (250 g) yogurt, 1½ ounces (44 g) natural cheese, or 1 cup (240 mL) fortified soy milk.  Fats and oils  · Aim for about 5 teaspoons (21 g) per day. Choose monounsaturated fats, such as canola and olive oils, avocados, peanut butter, and most nuts, or polyunsaturated fats, such as sunflower, corn, and soybean oils, walnuts, pine nuts, sesame seeds, sunflower seeds, and flaxseed.  Beverages  · Aim for six 8-oz glasses of water per day. Limit coffee to three to five 8-oz cups per day.  · Limit caffeinated beverages that have added calories, such as soda and energy drinks.  · Limit alcohol intake to no more than 1 drink a day for nonpregnant women and 2 drinks a day for men. One drink equals 12 oz of beer (355 mL), 5 oz of wine (148 mL), or 1½ oz of hard liquor (44 mL).  Seasoning and other foods  · Avoid adding excess amounts of salt to your foods. Try flavoring foods with herbs and spices instead of salt.  · Avoid adding sugar to foods.  · Try using oil-based dressings, sauces, and spreads instead of solid fats.  This information is based on general [place] nutrition guidelines. For more information, visit choosemyplate.gov. Exact amounts may vary based on your nutrition needs.  Summary  · A healthy eating plan may help you to maintain a healthy weight, reduce the risk of chronic diseases, and stay active throughout your life.  · Plan your meals. Make sure you eat the right portions of a variety of nutrient-rich foods.  · Try baking, boiling, grilling, or broiling instead of frying.  · Choose healthy options in all settings, including home, work, school, restaurants, or stores.  This information is not intended to  replace advice given to you by your health care provider. Make sure you discuss any questions you have with your health care provider.  Document Revised: 04/01/2019 Document Reviewed: 04/01/2019  Elsevier Patient Education © 2021 [employer] Inc.

## 2022-02-07 NOTE — PROGRESS NOTES
The ABCs of the Annual Wellness Visit  Subsequent Medicare Wellness Visit    Chief Complaint   Patient presents with   • Annual Exam     MEDICARE WELLNESS      Subjective    History of Present Illness:  Maggie Mathews is a 61 y.o. female who presents for a Subsequent Medicare Wellness Visit.    The following portions of the patient's history were reviewed and   updated as appropriate: allergies, current medications, past family history, past medical history, past social history, past surgical history and problem list.    Compared to one year ago, the patient feels her physical   health is the same.    Compared to one year ago, the patient feels her mental   health is better.    Recent Hospitalizations:  She was not admitted to the hospital during the last year.       Current Medical Providers:  Patient Care Team:  [place] APRN as PCP - General (Family Medicine)    Outpatient Medications Prior to Visit   Medication Sig Dispense Refill   • citalopram (CeleXA) 20 MG tablet Take 20 mg by mouth Every Morning.     • erythromycin (ROMYCIN) 5 MG/GM ophthalmic ointment Administer  into the left eye Every Night. 3.5 g 0   • metoprolol tartrate (LOPRESSOR) 25 MG tablet Take 1 tablet by mouth 2 (Two) Times a Day. 60 tablet 5   • pravastatin (Pravachol) 40 MG tablet Take 1 tablet by mouth Daily. 30 tablet 11   • risperiDONE (risperDAL) 1 MG tablet Take 1 mg by mouth Daily.     • vitamin D (ERGOCALCIFEROL) 1.25 MG (86599 UT) capsule capsule Take 1 capsule by mouth Every 7 (Seven) Days. 5 capsule 4     No facility-administered medications prior to visit.       No opioid medication identified on active medication list. I have reviewed chart for other potential  high risk medication/s and harmful drug interactions in the elderly.          Aspirin is not on active medication list.  Aspirin use is not indicated based on review of current medical condition/s. Risk of harm outweighs potential benefits.  .    Patient Active  "Problem List   Diagnosis   • Constipation   • Essential hypertension   • Hyperlipidemia   • Chronic pain of right knee   • Vitamin D deficiency   • Depression   • Obesity, Class I, BMI 30-34.9     Advance Care Planning  Advance Directive is not on file.  ACP discussion was held with the patient during this visit. Patient does not have an advance directive, information provided.          Objective    Vitals:    02/07/22 1123   BP: 124/72   BP Location: Left arm   Patient Position: Sitting   Cuff Size: Adult   Pulse: 87   Temp: 97.1 °F (36.2 °C)   TempSrc: Temporal   SpO2: 99%   Weight: 74.5 kg (164 lb 3.2 oz)   Height: 157.5 cm (62\")   PainSc: 0-No pain     BMI Readings from Last 1 Encounters:   02/07/22 30.03 kg/m²   BMI is above normal parameters. Recommendations include: educational material, exercise counseling and nutrition counseling    Does the patient have evidence of cognitive impairment? No    Physical Exam  Lab Results   Component Value Date    TRIG 51 01/10/2022    HDL 76 (H) 01/10/2022     01/10/2022    VLDL 10 01/10/2022            HEALTH RISK ASSESSMENT    Smoking Status:  Social History     Tobacco Use   Smoking Status Never Smoker   Smokeless Tobacco Never Used     Alcohol Consumption:  Social History     Substance and Sexual Activity   Alcohol Use Yes     Fall Risk Screen:    ROBBADI Fall Risk Assessment has not been completed.    Depression Screening:  PHQ-2/PHQ-9 Depression Screening 2/7/2022   Little interest or pleasure in doing things 0   Feeling down, depressed, or hopeless 0   Total Score 0       Health Habits and Functional and Cognitive Screening:  Functional & Cognitive Status 2/7/2022   Do you have difficulty preparing food and eating? No   Do you have difficulty bathing yourself, getting dressed or grooming yourself? No   Do you have difficulty using the toilet? No   Do you have difficulty moving around from place to place? No   Do you have trouble with steps or getting out of a " bed or a chair? No   Current Diet Other   Dental Exam Not up to date   Eye Exam Up to date   Exercise (times per week) 0 times per week   Current Exercises Include No Regular Exercise   Do you need help using the phone?  No   Are you deaf or do you have serious difficulty hearing?  No   Do you need help with transportation? No   Do you need help shopping? No   Do you need help preparing meals?  No   Do you need help with housework?  No   Do you need help with laundry? No   Do you need help taking your medications? No   Do you need help managing money? No   Do you ever drive or ride in a car without wearing a seat belt? No   Have you felt unusual stress, anger or loneliness in the last month? No   Who do you live with? Alone   If you need help, do you have trouble finding someone available to you? No   Have you been bothered in the last four weeks by sexual problems? No   Do you have difficulty concentrating, remembering or making decisions? No       Age-appropriate Screening Schedule:  Refer to the list below for future screening recommendations based on patient's age, sex and/or medical conditions. Orders for these recommended tests are listed in the plan section. The patient has been provided with a written plan.    Health Maintenance   Topic Date Due   • ZOSTER VACCINE (1 of 2) 02/07/2023 (Originally 3/19/2008)   • PAP SMEAR  07/02/2022   • MAMMOGRAM  07/26/2022   • LIPID PANEL  01/10/2023   • TDAP/TD VACCINES (2 - Td or Tdap) 06/27/2027   • INFLUENZA VACCINE  Completed              Assessment/Plan   CMS Preventative Services Quick Reference  Risk Factors Identified During Encounter  Cardiovascular Disease  Chronic Pain   Depression/Dysphoria  Immunizations Discussed/Encouraged (specific Immunizations; Shingrix  Inactivity/Sedentary  Obesity/Overweight   Polypharmacy  The above risks/problems have been discussed with the patient.  Follow up actions/plans if indicated are seen below in the Assessment/Plan  Section.  Pertinent information has been shared with the patient in the After Visit Summary.    Diagnoses and all orders for this visit:    1. Medicare annual wellness visit, initial (Primary)    2. Obesity, Class I, BMI 30-34.9        Follow Up:   Return in about 1 year (around 2/7/2023) for Medicare Wellness.     An After Visit Summary and PPPS were made available to the patient.                   This document has been electronically signed by MARLY Daniels on February 7, 2022 11:38 CST

## 2022-05-06 ENCOUNTER — TELEPHONE (OUTPATIENT)
Dept: FAMILY MEDICINE CLINIC | Facility: CLINIC | Age: 62
End: 2022-05-06

## 2022-05-06 NOTE — TELEPHONE ENCOUNTER
Patient called stating that she went to the ER on Wednesday for abdominal pain. They told her that her bowels were on one side and have her some liquid laxative. She said she has used the bathroom but her stomach still hurts. I informed her that we would get those records for [place] to review when she gets back from vacation but if the pain continues she can go back to the ED or an urgert care/ walk-in clinic.

## 2022-05-18 ENCOUNTER — OFFICE VISIT (OUTPATIENT)
Dept: FAMILY MEDICINE CLINIC | Facility: CLINIC | Age: 62
End: 2022-05-18

## 2022-05-18 ENCOUNTER — LAB (OUTPATIENT)
Dept: LAB | Facility: HOSPITAL | Age: 62
End: 2022-05-18

## 2022-05-18 VITALS
SYSTOLIC BLOOD PRESSURE: 126 MMHG | RESPIRATION RATE: 20 BRPM | HEIGHT: 62 IN | TEMPERATURE: 97.5 F | HEART RATE: 54 BPM | DIASTOLIC BLOOD PRESSURE: 76 MMHG | BODY MASS INDEX: 28.19 KG/M2 | OXYGEN SATURATION: 99 % | WEIGHT: 153.2 LBS

## 2022-05-18 DIAGNOSIS — K59.00 CONSTIPATION, UNSPECIFIED CONSTIPATION TYPE: ICD-10-CM

## 2022-05-18 DIAGNOSIS — R10.84 GENERALIZED ABDOMINAL PAIN: Primary | ICD-10-CM

## 2022-05-18 DIAGNOSIS — R79.89 ELEVATED LFTS: ICD-10-CM

## 2022-05-18 PROCEDURE — 99214 OFFICE O/P EST MOD 30 MIN: CPT | Performed by: NURSE PRACTITIONER

## 2022-05-18 PROCEDURE — 80053 COMPREHEN METABOLIC PANEL: CPT

## 2022-05-19 LAB
ALBUMIN SERPL-MCNC: 4 G/DL (ref 3.5–5.2)
ALBUMIN/GLOB SERPL: 1.4 G/DL
ALP SERPL-CCNC: 125 U/L (ref 39–117)
ALT SERPL W P-5'-P-CCNC: 83 U/L (ref 1–33)
ANION GAP SERPL CALCULATED.3IONS-SCNC: 14 MMOL/L (ref 5–15)
AST SERPL-CCNC: 27 U/L (ref 1–32)
BILIRUB SERPL-MCNC: 0.9 MG/DL (ref 0–1.2)
BUN SERPL-MCNC: 6 MG/DL (ref 8–23)
BUN/CREAT SERPL: 7.8 (ref 7–25)
CALCIUM SPEC-SCNC: 9.4 MG/DL (ref 8.6–10.5)
CHLORIDE SERPL-SCNC: 105 MMOL/L (ref 98–107)
CO2 SERPL-SCNC: 23 MMOL/L (ref 22–29)
CREAT SERPL-MCNC: 0.77 MG/DL (ref 0.57–1)
EGFRCR SERPLBLD CKD-EPI 2021: 86.3 ML/MIN/1.73
GLOBULIN UR ELPH-MCNC: 2.8 GM/DL
GLUCOSE SERPL-MCNC: 77 MG/DL (ref 65–99)
POTASSIUM SERPL-SCNC: 4.2 MMOL/L (ref 3.5–5.2)
PROT SERPL-MCNC: 6.8 G/DL (ref 6–8.5)
SODIUM SERPL-SCNC: 142 MMOL/L (ref 136–145)

## 2022-06-06 DIAGNOSIS — R74.8 ELEVATED LIVER ENZYMES: Primary | ICD-10-CM

## 2022-06-12 PROBLEM — R79.89 ELEVATED LFTS: Status: ACTIVE | Noted: 2022-06-12

## 2022-06-13 NOTE — PROGRESS NOTES
"Chief Complaint  Abdominal Pain    Subjective    History of Present Illness {  Problem List  Visit  Diagnosis   Encounters  Notes  Medications  Labs  Result Review Imaging  Media :23}     Maggie Mathews presents to [facility] [place] for   F/u on stomach pain - reports going to [facility] ED via EMS on 5/4/22 for this pain - reports being told, \"her bladder...everything was on one side\" - review of [facility] ED records indicate KUB xray with \"prominent stool within the right and transverse colon\"; notable labs: glucose 135, calcium 10.3, AlkP 138, , , Hgb 11.9. ED record provider comment: \"Past med records/labs reviewed and pt has stent to CBD (common bile duct) (for past four years. LFT's remarkably lower than previous. Pt  Non-tender to RUQ, normal WBC and afebrile. Stent present on KUB and large amt of stool present to explain symptoms\" Pt was advised to tx constipation with home laxative and f/u with GI in [place] who place stent. Pt reports last BM 2 days ago and is stooling every 3-5 days. Voices no other c/o or concerns today.        Objective     Physical Exam  Vitals and nursing note reviewed.   Constitutional:       General: She is not in acute distress.     Appearance: Normal appearance. She is not ill-appearing.   HENT:      Head: Normocephalic and atraumatic.   Eyes:      Conjunctiva/sclera: Conjunctivae normal.      Pupils: Pupils are equal, round, and reactive to light.   Neck:      Vascular: No carotid bruit.   Cardiovascular:      Rate and Rhythm: Normal rate and regular rhythm.      Heart sounds: Normal heart sounds.   Pulmonary:      Effort: Pulmonary effort is normal.      Breath sounds: Normal breath sounds.   Abdominal:      General: Bowel sounds are decreased.      Tenderness: There is no abdominal tenderness. There is no right CVA tenderness or left CVA tenderness.   Musculoskeletal:         General: Normal range of motion. " "     Cervical back: Normal range of motion and neck supple.   Skin:     General: Skin is warm and dry.   Neurological:      General: No focal deficit present.      Mental Status: She is alert and oriented to person, place, and time.   Psychiatric:         Mood and Affect: Mood normal.         Behavior: Behavior normal.        Result Review  Data Reviewed:{ Labs  Result Review  Imaging  Med Tab  Media :23}   The following data was reviewed by (Optional):[id number]}  Common labs    Common Labsle 7/8/21 7/8/21 7/8/21 1/10/22 5/18/22    1059 1059 1059     Glucose  89   77   BUN  12   6 (A)   Creatinine  0.90   0.77   eGFR African Am  77      Sodium  139   142   Potassium  3.9   4.2   Chloride  104   105   Calcium  9.4   9.4   Albumin  4.70   4.00   Total Bilirubin  0.3   0.9   Alkaline Phosphatase  76   125 (A)   AST (SGOT)  11   27   ALT (SGPT)  10   83 (A)   WBC 4.73       Hemoglobin 13.1       Hematocrit 40.2       Platelets 271       Total Cholesterol   212 (A) 186    Triglycerides   58 51    HDL Cholesterol   89 (A) 76 (A)    LDL Cholesterol    113 (A) 100    (A) Abnormal value            No Images in the past 120 days found..       Vital Signs:   /76 (BP Location: Left arm, Patient Position: Sitting, Cuff Size: Adult)   Pulse 54   Temp 97.5 °F (36.4 °C) (Temporal)   Resp 20   Ht 157.5 cm (62\")   Wt 69.5 kg (153 lb 3.2 oz)   SpO2 99%   BMI 28.02 kg/m²          Assessment and Plan {CC Problem List  Visit Diagnosis  ROS  Review (Popup)  Health Maintenance  Quality  BestPractice  Medications  SmartSets  SnapShot Encounters  Media :23}   Problem List Items Addressed This Visit        Gastrointestinal Abdominal     Constipation    Relevant Medications    linaclotide (LINZESS) 72 MCG capsule capsule    Elevated LFTs    Relevant Orders    Comprehensive metabolic panel (Completed)      Other Visit Diagnoses     Generalized abdominal pain    -  Primary         Diagnosis Plan   1. Generalized " abdominal pain     2. Constipation, unspecified constipation type  linaclotide (LINZESS) 72 MCG capsule capsule   3. Elevated LFTs  Comprehensive metabolic panel     - Abdominal pain improved - suspect constipation v/s liver (stent) as cause - pt to f/u with GI in [place] - advised to sign for records from [place] for review of stent placement surgery on 8/14/2017.   - Constipation - RX for Linzess submitted - use daily - will send in RX if helps with constipation - pt will let me know.   - Elevated LFTs - repeat CMP - if remaining elevated and pt unable to schedule with GI in [place] will submit new referral to GI.   - RTC at next scheduled f/u or PRN    Follow Up {Instructions Charge Capture  Follow-up Communications :23}   Return if symptoms worsen or fail to improve, for Next scheduled follow up.  Patient was given instructions and counseling regarding her condition or for health maintenance advice. Please see specific information pulled into the AVS if appropriate            .  This document has been electronically signed by MARLY Daniels on June 12, 2022 21:58 CDT

## 2022-06-20 DIAGNOSIS — E78.5 HYPERLIPIDEMIA, UNSPECIFIED HYPERLIPIDEMIA TYPE: ICD-10-CM

## 2022-06-20 RX ORDER — PRAVASTATIN SODIUM 40 MG
TABLET ORAL
Qty: 90 TABLET | Refills: 3 | Status: SHIPPED | OUTPATIENT
Start: 2022-06-20

## 2022-06-22 ENCOUNTER — TELEPHONE (OUTPATIENT)
Dept: FAMILY MEDICINE CLINIC | Facility: CLINIC | Age: 62
End: 2022-06-22

## 2022-06-22 NOTE — TELEPHONE ENCOUNTER
Spoke with patient and gave ultrasound results. Liver ultrasound showed mild fatty liver. Informed her that a fatty liver can caused elevated live enzymes and that we advise she do the MUÑOZ diet and avoid fatty food. Patient voiced understanding.

## 2022-07-11 ENCOUNTER — OFFICE VISIT (OUTPATIENT)
Dept: FAMILY MEDICINE CLINIC | Facility: CLINIC | Age: 62
End: 2022-07-11

## 2022-07-11 VITALS
DIASTOLIC BLOOD PRESSURE: 74 MMHG | RESPIRATION RATE: 20 BRPM | SYSTOLIC BLOOD PRESSURE: 100 MMHG | HEART RATE: 79 BPM | WEIGHT: 153.2 LBS | TEMPERATURE: 97.1 F | HEIGHT: 62 IN | BODY MASS INDEX: 28.19 KG/M2 | OXYGEN SATURATION: 99 %

## 2022-07-11 DIAGNOSIS — M16.11 OSTEOARTHRITIS OF RIGHT HIP, UNSPECIFIED OSTEOARTHRITIS TYPE: ICD-10-CM

## 2022-07-11 DIAGNOSIS — Z12.4 CERVICAL CANCER SCREENING: ICD-10-CM

## 2022-07-11 DIAGNOSIS — Z13.820 OSTEOPOROSIS SCREENING: ICD-10-CM

## 2022-07-11 DIAGNOSIS — Z12.31 ENCOUNTER FOR SCREENING MAMMOGRAM FOR MALIGNANT NEOPLASM OF BREAST: ICD-10-CM

## 2022-07-11 DIAGNOSIS — E55.9 VITAMIN D DEFICIENCY: ICD-10-CM

## 2022-07-11 DIAGNOSIS — Z78.0 POSTMENOPAUSE: ICD-10-CM

## 2022-07-11 DIAGNOSIS — G89.29 CHRONIC PAIN OF RIGHT KNEE: ICD-10-CM

## 2022-07-11 DIAGNOSIS — M25.561 CHRONIC PAIN OF RIGHT KNEE: ICD-10-CM

## 2022-07-11 DIAGNOSIS — I10 ESSENTIAL HYPERTENSION: Primary | ICD-10-CM

## 2022-07-11 DIAGNOSIS — K59.00 CONSTIPATION, UNSPECIFIED CONSTIPATION TYPE: ICD-10-CM

## 2022-07-11 PROCEDURE — 99214 OFFICE O/P EST MOD 30 MIN: CPT | Performed by: NURSE PRACTITIONER

## 2022-07-11 RX ORDER — ERGOCALCIFEROL 1.25 MG/1
50000 CAPSULE ORAL
Qty: 12 CAPSULE | Refills: 1 | Status: SHIPPED | OUTPATIENT
Start: 2022-07-11

## 2022-07-11 NOTE — PROGRESS NOTES
Chief Complaint  Hypertension    Subjective    History of Present Illness {CC  Problem List  Visit  Diagnosis   Encounters  Notes  Medications  Labs  Result Review Imaging  Media :23}     Maggie Mathews presents to [facility] [place] for   6 mos f/u on BP - reports BP well controlled - on low side today - denies blurry vision, dizziness, HA, CP, SOA or LE edema. Is requesting refills of Linzess, vit D and metoprolol. Also requesting RX for seated wheeled walker d/t chronic pain of right knee and hip. Voices no other c/o or concerns today.        Objective     Physical Exam  Vitals and nursing note reviewed.   Constitutional:       General: She is not in acute distress.     Appearance: Normal appearance. She is not ill-appearing.   HENT:      Head: Normocephalic and atraumatic.   Eyes:      Conjunctiva/sclera: Conjunctivae normal.      Pupils: Pupils are equal, round, and reactive to light.   Neck:      Vascular: No carotid bruit.   Cardiovascular:      Rate and Rhythm: Normal rate and regular rhythm.      Heart sounds: Normal heart sounds.   Pulmonary:      Effort: Pulmonary effort is normal.      Breath sounds: Normal breath sounds. No wheezing or rhonchi.   Musculoskeletal:         General: Swelling (mild - R knee) and tenderness (right thigh and right knee) present. Normal range of motion.      Cervical back: Normal range of motion and neck supple.      Comments: Mild crepitus with ROM of right knee   Lymphadenopathy:      Cervical: No cervical adenopathy.   Skin:     General: Skin is warm and dry.   Neurological:      General: No focal deficit present.      Mental Status: She is alert and oriented to person, place, and time.      Motor: No weakness.      Gait: Gait abnormal.   Psychiatric:         Mood and Affect: Mood normal.         Behavior: Behavior normal.        Result Review  Data Reviewed:{ Labs  Result Review  Imaging  Med Tab  Media :23}  "  The following data was reviewed by (Optional):[id number]}  Common labs    Common Labsle 1/10/22 5/18/22   Glucose  77   BUN  6 (A)   Creatinine  0.77   Sodium  142   Potassium  4.2   Chloride  105   Calcium  9.4   Albumin  4.00   Total Bilirubin  0.9   Alkaline Phosphatase  125 (A)   AST (SGOT)  27   ALT (SGPT)  83 (A)   Total Cholesterol 186    Triglycerides 51    HDL Cholesterol 76 (A)    LDL Cholesterol  100    (A) Abnormal value            No Images in the past 120 days found..       Vital Signs:   /74 (BP Location: Left arm, Patient Position: Sitting, Cuff Size: Adult)   Pulse 79   Temp 97.1 °F (36.2 °C) (Temporal)   Resp 20   Ht 157.5 cm (62\")   Wt 69.5 kg (153 lb 3.2 oz)   SpO2 99%   BMI 28.02 kg/m²          Assessment and Plan {CC Problem List  Visit Diagnosis  ROS  Review (Popup)  Health Maintenance  Quality  BestPractice  Medications  SmartSets  SnapShot Encounters  Media :23}   Problem List Items Addressed This Visit        Cardiac and Vasculature    Essential hypertension - Primary    Relevant Medications    metoprolol tartrate (LOPRESSOR) 25 MG tablet       Endocrine and Metabolic    Vitamin D deficiency    Relevant Medications    vitamin D (ERGOCALCIFEROL) 1.25 MG (85023 UT) capsule capsule       Gastrointestinal Abdominal     Constipation    Relevant Medications    linaclotide (LINZESS) 72 MCG capsule capsule       Musculoskeletal and Injuries    Chronic pain of right knee (Chronic)    Relevant Medications    Misc. Devices (Step N Rest Walker) misc    Osteoarthritis of right hip    Relevant Medications    Misc. Devices (Step N Rest Walker) misc      Other Visit Diagnoses     Cervical cancer screening        Relevant Orders    Ambulatory Referral to Gynecology    Postmenopause        Relevant Orders    DEXA Bone Density Axial    Osteoporosis screening        Relevant Orders    DEXA Bone Density Axial    Encounter for screening mammogram for malignant neoplasm of breast        " Relevant Orders    Mammo Screening Bilateral With CAD         Diagnosis Plan   1. Essential hypertension  metoprolol tartrate (LOPRESSOR) 25 MG tablet   2. Chronic pain of right knee  Misc. Devices (Step N Rest Walker) misc   3. Osteoarthritis of right hip, unspecified osteoarthritis type  Misc. Devices (Step N Rest Walker) misc   4. Constipation, unspecified constipation type  linaclotide (LINZESS) 72 MCG capsule capsule   5. Vitamin D deficiency  vitamin D (ERGOCALCIFEROL) 1.25 MG (93657 UT) capsule capsule   6. Cervical cancer screening  Ambulatory Referral to Gynecology   7. Postmenopause  DEXA Bone Density Axial   8. Osteoporosis screening  DEXA Bone Density Axial   9. Encounter for screening mammogram for malignant neoplasm of breast  Mammo Screening Bilateral With CAD     - HTN - controlled - refill of metoprolol submitted - cont current dosing.   - Chronic R knee/R hip pain - Printed RX for seated walker with wheels provided.   - Constipation - stable - refill of linaclotide submitted - cont current dosing.   - Vit D - improving - refill of Vit D submitted - cont current dosing.   - PAP - referral to GYN submitted  - Mammo/Dexa - orders submitted.  - RTC 6 mos for f/u or PRN    Follow Up {Instructions Charge Capture  Follow-up Communications :23}   Return in about 6 months (around 1/11/2023), or if symptoms worsen or fail to improve, for Recheck.  Patient was given instructions and counseling regarding her condition or for health maintenance advice. Please see specific information pulled into the AVS if appropriate            .  This document has been electronically signed by MARLY Daniels on July 12, 2022 13:11 CDT

## 2022-08-18 ENCOUNTER — OFFICE VISIT (OUTPATIENT)
Dept: OBSTETRICS AND GYNECOLOGY | Facility: CLINIC | Age: 62
End: 2022-08-18

## 2022-08-18 ENCOUNTER — TELEPHONE (OUTPATIENT)
Dept: FAMILY MEDICINE CLINIC | Facility: CLINIC | Age: 62
End: 2022-08-18

## 2022-08-18 VITALS — WEIGHT: 153 LBS | BODY MASS INDEX: 27.98 KG/M2 | DIASTOLIC BLOOD PRESSURE: 64 MMHG | SYSTOLIC BLOOD PRESSURE: 132 MMHG

## 2022-08-18 DIAGNOSIS — H00.025 HORDEOLUM INTERNUM OF LEFT LOWER EYELID: ICD-10-CM

## 2022-08-18 DIAGNOSIS — Z12.4 ENCOUNTER FOR PAPANICOLAOU SMEAR FOR CERVICAL CANCER SCREENING: ICD-10-CM

## 2022-08-18 DIAGNOSIS — M16.11 PRIMARY OSTEOARTHRITIS OF RIGHT HIP: ICD-10-CM

## 2022-08-18 DIAGNOSIS — M25.561 CHRONIC PAIN OF RIGHT KNEE: Primary | ICD-10-CM

## 2022-08-18 DIAGNOSIS — Z01.419 WELL WOMAN EXAM WITH ROUTINE GYNECOLOGICAL EXAM: Primary | ICD-10-CM

## 2022-08-18 DIAGNOSIS — G89.29 CHRONIC PAIN OF RIGHT KNEE: Primary | ICD-10-CM

## 2022-08-18 PROCEDURE — G0101 CA SCREEN;PELVIC/BREAST EXAM: HCPCS | Performed by: NURSE PRACTITIONER

## 2022-08-18 RX ORDER — RISPERIDONE 1 MG/1
1 TABLET ORAL
COMMUNITY
Start: 2022-07-24

## 2022-08-18 NOTE — PROGRESS NOTES
Subjective   Maggie Mathews is a 62 y.o. here for gyn annual    Maggie Mathews is a 62 yr old  postmenopausal female. Last pap 2019, Normal. Denies any hx of abnormal pap smears. Not sexually active in the last 2-3 years. No concerns today. Has mammogram and DEXA scan later today. Does not smoke. Patient reports that she is not currently experiencing any symptoms of urinary incontinence.      Gynecologic Exam  The patient's pertinent negatives include no genital itching, genital lesions, genital odor, genital rash, missed menses, pelvic pain, vaginal bleeding or vaginal discharge. Pertinent negatives include no abdominal pain, chills, constipation, diarrhea, dysuria, fever, frequency, nausea or rash. She is not sexually active. She uses nothing for contraception. She is postmenopausal.       The following portions of the patient's history were reviewed and updated as appropriate: allergies, current medications, past family history, past medical history, past social history, past surgical history and problem list.    Review of Systems   Constitutional: Negative for chills, fatigue, fever, unexpected weight gain and unexpected weight loss.   Respiratory: Negative for shortness of breath.    Cardiovascular: Negative for chest pain and palpitations.   Gastrointestinal: Negative for abdominal pain, constipation, diarrhea and nausea.   Endocrine: Negative for cold intolerance and heat intolerance.   Genitourinary: Negative for amenorrhea, breast discharge, breast lump, breast pain, difficulty urinating, dysuria, frequency, menstrual problem, missed menses, pelvic pain, pelvic pressure, urinary incontinence, vaginal bleeding, vaginal discharge and vaginal pain.   Skin: Negative for rash.   Neurological: Negative for headache.   Psychiatric/Behavioral: Negative for sleep disturbance, depressed mood and stress.       Objective   Physical Exam  Vitals and nursing note reviewed. Exam conducted with a chaperone  present.   Constitutional:       Appearance: She is well-developed.   HENT:      Head: Normocephalic and atraumatic.   Neck:      Thyroid: No thyromegaly.   Cardiovascular:      Rate and Rhythm: Normal rate and regular rhythm.      Heart sounds: Normal heart sounds.   Pulmonary:      Effort: Pulmonary effort is normal. No respiratory distress.      Breath sounds: Normal breath sounds.   Chest:   Breasts:      Right: Normal.      Left: Normal.       Abdominal:      General: There is no distension.      Palpations: Abdomen is soft.      Tenderness: There is no abdominal tenderness.   Genitourinary:     General: Normal vulva.      Vagina: Normal.      Cervix: Normal.      Uterus: Normal.       Adnexa: Right adnexa normal and left adnexa normal.      Rectum: Normal.      Comments: Pap cotesting collected.   Musculoskeletal:         General: Normal range of motion.      Cervical back: Normal range of motion and neck supple.   Skin:     General: Skin is warm and dry.   Neurological:      Mental Status: She is alert and oriented to person, place, and time.   Psychiatric:         Behavior: Behavior normal.           Assessment & Plan   Diagnoses and all orders for this visit:    1. Well woman exam with routine gynecological exam (Primary)    2. Encounter for Papanicolaou smear for cervical cancer screening  -     LIQUID-BASED PAP SMEAR, [facility] ([facility])        Reviewed pap cotesting guidelines; if pap is normal today, may discontinue screening. Counseled on annual mammograms.

## 2022-08-18 NOTE — TELEPHONE ENCOUNTER
CALLED TO LET PATIENT KNOW THAT THERE IS AN ORDER FOR A STANDARD WALKER AT THE DESK FOR HER NO ANSWER LEFT MESSAGE

## 2022-08-20 RX ORDER — ERYTHROMYCIN 5 MG/G
OINTMENT OPHTHALMIC NIGHTLY
Qty: 3.5 G | Refills: 0 | Status: SHIPPED | OUTPATIENT
Start: 2022-08-20

## 2022-08-23 LAB — REF LAB TEST METHOD: NORMAL

## 2022-08-25 ENCOUNTER — TELEPHONE (OUTPATIENT)
Dept: FAMILY MEDICINE CLINIC | Facility: CLINIC | Age: 62
End: 2022-08-25

## 2022-08-25 NOTE — TELEPHONE ENCOUNTER
----- Message from MARLY Daniels sent at 8/24/2022 10:14 PM CDT -----  Regarding: mammogram result  Please notify pt that her mammogram showed no findings suspicious for malignancy. She does have dense breast tissue so yearly mammograms are recommended.  Still awaiting DEXA result - will notify when results are received.

## 2022-08-25 NOTE — TELEPHONE ENCOUNTER
Patient notified of normal mammogram results with dense breast tissue. Told her we would reach out again once we receive her dexa results.

## 2022-08-30 ENCOUNTER — TELEPHONE (OUTPATIENT)
Dept: FAMILY MEDICINE CLINIC | Facility: CLINIC | Age: 62
End: 2022-08-30

## 2022-08-30 DIAGNOSIS — M85.852 OSTEOPENIA OF LEFT HIP: Primary | ICD-10-CM

## 2022-08-30 NOTE — TELEPHONE ENCOUNTER
----- Message from MARLY Daniels sent at 8/29/2022 12:31 AM CDT -----  Regarding: DEXA Result  Please notify pt that her DEXA scan result indicates evidence of osteopenia which is the beginning stages of thinning of the bones. I recommend referral to our [facility] for further management of this. If agreeable please let me know.

## 2022-09-07 DIAGNOSIS — Z12.31 ENCOUNTER FOR SCREENING MAMMOGRAM FOR MALIGNANT NEOPLASM OF BREAST: ICD-10-CM

## 2022-09-07 DIAGNOSIS — Z13.820 OSTEOPOROSIS SCREENING: ICD-10-CM

## 2022-09-07 DIAGNOSIS — Z78.0 POSTMENOPAUSE: ICD-10-CM

## 2022-10-12 ENCOUNTER — TELEPHONE (OUTPATIENT)
Dept: FAMILY MEDICINE CLINIC | Facility: CLINIC | Age: 62
End: 2022-10-12

## 2022-10-31 ENCOUNTER — OFFICE VISIT (OUTPATIENT)
Dept: ORTHOPEDIC SURGERY | Facility: CLINIC | Age: 62
End: 2022-10-31

## 2022-10-31 VITALS — WEIGHT: 151.4 LBS | HEIGHT: 62 IN | BODY MASS INDEX: 27.86 KG/M2

## 2022-10-31 DIAGNOSIS — E55.9 VITAMIN D DEFICIENCY: ICD-10-CM

## 2022-10-31 DIAGNOSIS — Z78.0 POSTMENOPAUSAL: ICD-10-CM

## 2022-10-31 DIAGNOSIS — M85.852 OSTEOPENIA OF NECK OF LEFT FEMUR: ICD-10-CM

## 2022-10-31 DIAGNOSIS — Z13.820 SCREENING FOR OSTEOPOROSIS: Primary | ICD-10-CM

## 2022-10-31 PROCEDURE — 99214 OFFICE O/P EST MOD 30 MIN: CPT | Performed by: NURSE PRACTITIONER

## 2022-10-31 RX ORDER — LYSINE HCL 500 MG
1 TABLET ORAL 2 TIMES DAILY
Qty: 60 EACH | Refills: 11 | Status: SHIPPED | OUTPATIENT
Start: 2022-10-31

## 2022-10-31 NOTE — PROGRESS NOTES
"Maggie Mathews is a 62 y.o. female returns for     Chief Complaint   Patient presents with   • Osteoporosis     Bone Health      HISTORY OF PRESENT ILLNESS: 62-year-old -American female patient [facility] for [facility] health evaluation and to discuss treatment of osteopenia.    Last Bone Density Study: 8/18/2022    Referred by: MARLY Mendez (primary care)    History of Osteoporosis or Osteopenia: No.  Osteopenia is a new diagnosis for this patient based on her recent DEXA scan  Prior Treatments for Osteoporosis: None    Decrease in Height: No    Fracture History: None    High Risk Medications:   Current: Celexa   Previous: Celexa, Prilosec; No long-term use of systemic steroids greater than 3 months reported    Comorbid Medical Conditions that Increase Risk for Osteoporosis: Vitamin D deficiency    Postmenopausal status: Postmenopausal  Age of Menopause/Hysterectomy: Unknown.  Patient cannot recall the age she went through menopause but denies early menopause.  Hormone Replacement Therapy: No    Family History of Osteoporosis: No    History of Smoking: No, never smoker.    Taking Calcium supplements: No  Taking Vitamin D supplements: Yes, taking a high-dose vitamin D supplement of 50,000 units weekly for vitamin D deficiency.  Last vitamin D level was markedly low at 6.9 as of 1/10/2022.    Activity Level: Patient does not exercise.  She is ambulatory in office today with no assistive device but has used a walker in recent months due to chronic knee pain and chronic hip pain.       CONCURRENT MEDICAL HISTORY:    The following portions of the patient's history were reviewed and updated as appropriate: allergies, current medications, past family history, past medical history, past social history, past surgical history and problem list.     ROS  No fevers or chills.  No chest pain or shortness of air.  No GI or  disturbances.    PHYSICAL EXAMINATION:       Ht 157.5 cm (62\")   Wt 68.7 " kg (151 lb 6.4 oz)   LMP  (LMP Unknown)   BMI 27.69 kg/m²     Physical Exam  Vitals reviewed.   Constitutional:       General: She is not in acute distress.     Appearance: She is well-developed. She is not ill-appearing.   HENT:      Head: Normocephalic.   Pulmonary:      Effort: Pulmonary effort is normal. No respiratory distress.   Abdominal:      General: There is no distension.      Palpations: Abdomen is soft.   Skin:     General: Skin is warm and dry.      Capillary Refill: Capillary refill takes less than 2 seconds.      Findings: No erythema.   Neurological:      Mental Status: She is alert and oriented to person, place, and time.      GCS: GCS eye subscore is 4. GCS verbal subscore is 5. GCS motor subscore is 6.   Psychiatric:         Speech: Speech normal.         Behavior: Behavior normal.         Thought Content: Thought content normal.         Judgment: Judgment normal.         GAIT:     []  Normal  [x]  Antalgic (mild)    Assistive device: [x]  None  []  Walker     []  Crutches  []  Cane     []  Wheelchair  []  Stretcher    Back Exam     Other   Gait: antalgic (Mild)     Comments:  No kyphosis or deformity noted.  No focal neurological deficits noted.        DEXA Bone Density Axial performed @ [facility] on 8/18/2022    LUMBAR SPINE: The average bone mineral density within the lumbar spine is 1.048 g/cm².  As compared to the standard young normals, the T score 0.0.  As compared to the age and gender matched subjects, the Z score is 1.7    LEFT HIP: The average bone mineral density within the left hip is 0.662 g/cm².  As compared to a standard young normals, the T score is -1.7.  As compared to the age and gender matched subjects, the Z score is -0.2.    The 10-year major osteoporotic fracture risk is 4%    IMPRESSION:  The bone mineral density according to the WHO criteria is indicative of osteopenia.      ASSESSMENT:    Diagnoses and all orders for this visit:    Screening for  osteoporosis    Osteopenia of neck of left femur    Postmenopausal    Vitamin D deficiency    Other orders  -     Calcium Carbonate-Vit D-Min (Calcium 600+D Plus Minerals) 600-400 MG-UNIT tablet; Take 1 tablet by mouth 2 (Two) Times a Day.    PLAN     Bone density study results from 8/18/2022 are reviewed and discussed with patient today.  We discussed that she has a T score of 0.0 in the lumbar spine, indicative of normal bone density.  We discussed that she has a T score of -1.7 in the left hip, indicative of osteopenia.  I explained these T-scores and what they mean, using a visual chart as well.  At this time, she does not have osteoporosis.  We discussed that she would be at an increased risk for fracture due to osteopenia as well as an increased risk for developing osteoporosis in the future.  She has not sustained any fractures and has no history of fragility fracture.  Her current 10-year major osteoporotic fracture risk is 4%.  We discussed that osteopenia and osteoporosis are chronic, progressive and silent diseases that typically require routine screenings every 2 years and once identified, typically requires long-term treatment.  At this time, the patient does not need osteoporosis medication and I explained this today to the patient.  We discussed that the focus would be on healthy lifestyle choices and preventative measures.  Patient verbalized understanding of all the information provided today.    We discussed the importance of proper nutrition and adequate intake of calcium and vitamin D for optimal bone health and prevention of osteoporosis.  The patient is not currently taking calcium supplementation.  Recommend starting a calcium supplement twice daily for support of bone health and to prevent further bone loss.  A prescription for calcium was sent to the patient's pharmacy today.  Recommend to continue with her high-dose vitamin D supplementation of 50,000 units weekly for treatment of her  vitamin D deficiency, which is significant.  Her vitamin D level is noted to have been 6.9 in January 2022 and she has not yet had a recheck of this.  We also discussed the importance of regular exercise, especially weightbearing exercise such as walking, for optimal bone health and prevention of osteoporosis.  Patient does have some mobility limitations for weightbearing exercise due to chronic pain in her right hip and right knee.  An educational packet regarding osteoporosis is provided to the patient today.  The patient is a non-smoker with no history of smoking so smoking cessation is not addressed or applicable    Recommend a repeat bone density study in 2 years and follow-up with [facility].    Time spent of a minimum of 30 minutes including the face to face evaluation, reviewing of medical history and prior medial records, reviewing of diagnostic studies, prescription drug management, documentation, patient education and coordination of care.     EMR Dragon/Transciption Disclaimer: Some of this note may be an electronic transcription/translation of spoken language to printed text using the Dragon Dictation System.     Return in about 2 years (around 10/31/2024) for Recheck.        This document has been electronically signed by MARLY Parker on October 31, 2022 11:33 CDT      MARLY Parker